# Patient Record
Sex: MALE | Race: WHITE | NOT HISPANIC OR LATINO | Employment: OTHER | URBAN - METROPOLITAN AREA
[De-identification: names, ages, dates, MRNs, and addresses within clinical notes are randomized per-mention and may not be internally consistent; named-entity substitution may affect disease eponyms.]

---

## 2019-01-16 ENCOUNTER — RECORDS - HEALTHEAST (OUTPATIENT)
Dept: LAB | Facility: CLINIC | Age: 84
End: 2019-01-16

## 2019-01-16 LAB
ALBUMIN SERPL-MCNC: 3.7 G/DL (ref 3.5–5)
ALP SERPL-CCNC: 59 U/L (ref 45–120)
ALT SERPL W P-5'-P-CCNC: 17 U/L (ref 0–45)
ANION GAP SERPL CALCULATED.3IONS-SCNC: 8 MMOL/L (ref 5–18)
AST SERPL W P-5'-P-CCNC: 26 U/L (ref 0–40)
BILIRUB SERPL-MCNC: 0.6 MG/DL (ref 0–1)
BUN SERPL-MCNC: 15 MG/DL (ref 8–28)
CALCIUM SERPL-MCNC: 9.1 MG/DL (ref 8.5–10.5)
CHLORIDE BLD-SCNC: 104 MMOL/L (ref 98–107)
CHOLEST SERPL-MCNC: 173 MG/DL
CO2 SERPL-SCNC: 29 MMOL/L (ref 22–31)
CREAT SERPL-MCNC: 0.72 MG/DL (ref 0.7–1.3)
ERYTHROCYTE [DISTWIDTH] IN BLOOD BY AUTOMATED COUNT: 14.4 % (ref 11–14.5)
FASTING STATUS PATIENT QL REPORTED: NORMAL
GFR SERPL CREATININE-BSD FRML MDRD: >60 ML/MIN/1.73M2
GLUCOSE BLD-MCNC: 86 MG/DL (ref 70–125)
HCT VFR BLD AUTO: 47.7 % (ref 40–54)
HDLC SERPL-MCNC: 57 MG/DL
HGB BLD-MCNC: 14.8 G/DL (ref 14–18)
LDLC SERPL CALC-MCNC: 100 MG/DL
MCH RBC QN AUTO: 29.5 PG (ref 27–34)
MCHC RBC AUTO-ENTMCNC: 31 G/DL (ref 32–36)
MCV RBC AUTO: 95 FL (ref 80–100)
PLATELET # BLD AUTO: 198 THOU/UL (ref 140–440)
PMV BLD AUTO: 12 FL (ref 8.5–12.5)
POTASSIUM BLD-SCNC: 4.5 MMOL/L (ref 3.5–5)
PROT SERPL-MCNC: 6 G/DL (ref 6–8)
RBC # BLD AUTO: 5.01 MILL/UL (ref 4.4–6.2)
SODIUM SERPL-SCNC: 141 MMOL/L (ref 136–145)
TRIGL SERPL-MCNC: 79 MG/DL
WBC: 6 THOU/UL (ref 4–11)

## 2020-01-17 ENCOUNTER — RECORDS - HEALTHEAST (OUTPATIENT)
Dept: LAB | Facility: CLINIC | Age: 85
End: 2020-01-17

## 2020-01-17 LAB
ALBUMIN SERPL-MCNC: 3.7 G/DL (ref 3.5–5)
ALP SERPL-CCNC: 73 U/L (ref 45–120)
ALT SERPL W P-5'-P-CCNC: 14 U/L (ref 0–45)
ANION GAP SERPL CALCULATED.3IONS-SCNC: 9 MMOL/L (ref 5–18)
AST SERPL W P-5'-P-CCNC: 23 U/L (ref 0–40)
BILIRUB SERPL-MCNC: 0.4 MG/DL (ref 0–1)
BUN SERPL-MCNC: 14 MG/DL (ref 8–28)
CALCIUM SERPL-MCNC: 8.9 MG/DL (ref 8.5–10.5)
CHLORIDE BLD-SCNC: 101 MMOL/L (ref 98–107)
CHOLEST SERPL-MCNC: 160 MG/DL
CO2 SERPL-SCNC: 30 MMOL/L (ref 22–31)
CREAT SERPL-MCNC: 0.73 MG/DL (ref 0.7–1.3)
FASTING STATUS PATIENT QL REPORTED: NORMAL
GFR SERPL CREATININE-BSD FRML MDRD: >60 ML/MIN/1.73M2
GLUCOSE BLD-MCNC: 83 MG/DL (ref 70–125)
HDLC SERPL-MCNC: 46 MG/DL
LDLC SERPL CALC-MCNC: 102 MG/DL
POTASSIUM BLD-SCNC: 4.4 MMOL/L (ref 3.5–5)
PROT SERPL-MCNC: 6.1 G/DL (ref 6–8)
SODIUM SERPL-SCNC: 140 MMOL/L (ref 136–145)
TRIGL SERPL-MCNC: 58 MG/DL

## 2021-02-10 ENCOUNTER — RECORDS - HEALTHEAST (OUTPATIENT)
Dept: LAB | Facility: CLINIC | Age: 86
End: 2021-02-10

## 2021-02-10 LAB
ALBUMIN SERPL-MCNC: 4 G/DL (ref 3.5–5)
ALP SERPL-CCNC: 74 U/L (ref 45–120)
ALT SERPL W P-5'-P-CCNC: 18 U/L (ref 0–45)
ANION GAP SERPL CALCULATED.3IONS-SCNC: 6 MMOL/L (ref 5–18)
AST SERPL W P-5'-P-CCNC: 24 U/L (ref 0–40)
BILIRUB SERPL-MCNC: 0.6 MG/DL (ref 0–1)
BUN SERPL-MCNC: 19 MG/DL (ref 8–28)
CALCIUM SERPL-MCNC: 9.3 MG/DL (ref 8.5–10.5)
CHLORIDE BLD-SCNC: 104 MMOL/L (ref 98–107)
CHOLEST SERPL-MCNC: 206 MG/DL
CO2 SERPL-SCNC: 32 MMOL/L (ref 22–31)
CREAT SERPL-MCNC: 0.8 MG/DL (ref 0.7–1.3)
FASTING STATUS PATIENT QL REPORTED: ABNORMAL
GFR SERPL CREATININE-BSD FRML MDRD: >60 ML/MIN/1.73M2
GLUCOSE BLD-MCNC: 83 MG/DL (ref 70–125)
HDLC SERPL-MCNC: 58 MG/DL
LDLC SERPL CALC-MCNC: 128 MG/DL
POTASSIUM BLD-SCNC: 4.1 MMOL/L (ref 3.5–5)
PROT SERPL-MCNC: 6.6 G/DL (ref 6–8)
SODIUM SERPL-SCNC: 142 MMOL/L (ref 136–145)
TRIGL SERPL-MCNC: 99 MG/DL

## 2021-05-28 ENCOUNTER — RECORDS - HEALTHEAST (OUTPATIENT)
Dept: ADMINISTRATIVE | Facility: CLINIC | Age: 86
End: 2021-05-28

## 2022-06-29 ENCOUNTER — LAB REQUISITION (OUTPATIENT)
Dept: LAB | Facility: CLINIC | Age: 87
End: 2022-06-29
Payer: COMMERCIAL

## 2022-06-29 DIAGNOSIS — I10 ESSENTIAL (PRIMARY) HYPERTENSION: ICD-10-CM

## 2022-06-29 PROCEDURE — 80053 COMPREHEN METABOLIC PANEL: CPT | Mod: ORL | Performed by: FAMILY MEDICINE

## 2022-06-29 PROCEDURE — 84443 ASSAY THYROID STIM HORMONE: CPT | Mod: ORL | Performed by: FAMILY MEDICINE

## 2022-06-30 LAB
ALBUMIN SERPL BCG-MCNC: 4 G/DL (ref 3.5–5.2)
ALP SERPL-CCNC: 69 U/L (ref 40–129)
ALT SERPL W P-5'-P-CCNC: 15 U/L (ref 10–50)
ANION GAP SERPL CALCULATED.3IONS-SCNC: 10 MMOL/L (ref 7–15)
AST SERPL W P-5'-P-CCNC: 23 U/L (ref 10–50)
BILIRUB SERPL-MCNC: 0.3 MG/DL
BUN SERPL-MCNC: 15.9 MG/DL (ref 8–23)
CALCIUM SERPL-MCNC: 8.7 MG/DL (ref 8.8–10.2)
CHLORIDE SERPL-SCNC: 102 MMOL/L (ref 98–107)
CREAT SERPL-MCNC: 1.04 MG/DL (ref 0.67–1.17)
DEPRECATED HCO3 PLAS-SCNC: 26 MMOL/L (ref 22–29)
GFR SERPL CREATININE-BSD FRML MDRD: 70 ML/MIN/1.73M2
GLUCOSE SERPL-MCNC: 104 MG/DL (ref 70–99)
POTASSIUM SERPL-SCNC: 4.5 MMOL/L (ref 3.4–5.3)
PROT SERPL-MCNC: 5.9 G/DL (ref 6.4–8.3)
SODIUM SERPL-SCNC: 138 MMOL/L (ref 136–145)
TSH SERPL DL<=0.005 MIU/L-ACNC: 4.04 UIU/ML (ref 0.3–4.2)

## 2023-07-10 ENCOUNTER — LAB REQUISITION (OUTPATIENT)
Dept: LAB | Facility: CLINIC | Age: 88
End: 2023-07-10
Payer: COMMERCIAL

## 2023-07-10 DIAGNOSIS — I10 ESSENTIAL (PRIMARY) HYPERTENSION: ICD-10-CM

## 2023-07-10 LAB
ANION GAP SERPL CALCULATED.3IONS-SCNC: 13 MMOL/L (ref 7–15)
BUN SERPL-MCNC: 16.2 MG/DL (ref 8–23)
CALCIUM SERPL-MCNC: 9.1 MG/DL (ref 8.8–10.2)
CHLORIDE SERPL-SCNC: 106 MMOL/L (ref 98–107)
CREAT SERPL-MCNC: 0.69 MG/DL (ref 0.67–1.17)
DEPRECATED HCO3 PLAS-SCNC: 24 MMOL/L (ref 22–29)
ERYTHROCYTE [DISTWIDTH] IN BLOOD BY AUTOMATED COUNT: 16.4 % (ref 10–15)
GFR SERPL CREATININE-BSD FRML MDRD: 90 ML/MIN/1.73M2
GLUCOSE SERPL-MCNC: 87 MG/DL (ref 70–99)
HCT VFR BLD AUTO: 49.4 % (ref 40–53)
HGB BLD-MCNC: 15.7 G/DL (ref 13.3–17.7)
MCH RBC QN AUTO: 33 PG (ref 26.5–33)
MCHC RBC AUTO-ENTMCNC: 31.8 G/DL (ref 31.5–36.5)
MCV RBC AUTO: 104 FL (ref 78–100)
PLATELET # BLD AUTO: 260 10E3/UL (ref 150–450)
POTASSIUM SERPL-SCNC: 4.5 MMOL/L (ref 3.4–5.3)
RBC # BLD AUTO: 4.76 10E6/UL (ref 4.4–5.9)
SODIUM SERPL-SCNC: 143 MMOL/L (ref 136–145)
WBC # BLD AUTO: 7.4 10E3/UL (ref 4–11)

## 2023-07-10 PROCEDURE — 85027 COMPLETE CBC AUTOMATED: CPT | Mod: ORL | Performed by: FAMILY MEDICINE

## 2023-07-10 PROCEDURE — 80048 BASIC METABOLIC PNL TOTAL CA: CPT | Mod: ORL | Performed by: FAMILY MEDICINE

## 2023-12-27 ENCOUNTER — HOSPITAL ENCOUNTER (INPATIENT)
Facility: HOSPITAL | Age: 88
LOS: 7 days | Discharge: HOME HEALTH | DRG: 179 | End: 2024-01-03
Attending: EMERGENCY MEDICINE | Admitting: INTERNAL MEDICINE
Payer: COMMERCIAL

## 2023-12-27 ENCOUNTER — APPOINTMENT (OUTPATIENT)
Dept: RADIOLOGY | Facility: HOSPITAL | Age: 88
DRG: 179 | End: 2023-12-27
Attending: EMERGENCY MEDICINE
Payer: COMMERCIAL

## 2023-12-27 DIAGNOSIS — U07.1 COVID-19: ICD-10-CM

## 2023-12-27 DIAGNOSIS — I10 BENIGN ESSENTIAL HYPERTENSION: Primary | ICD-10-CM

## 2023-12-27 DIAGNOSIS — R53.1 GENERALIZED WEAKNESS: ICD-10-CM

## 2023-12-27 PROBLEM — R79.89 ELEVATED TROPONIN: Status: ACTIVE | Noted: 2023-12-27

## 2023-12-27 LAB
ABO/RH(D): NORMAL
ANION GAP SERPL CALCULATED.3IONS-SCNC: 14 MMOL/L (ref 7–15)
ANTIBODY SCREEN: NEGATIVE
BASOPHILS # BLD AUTO: 0 10E3/UL (ref 0–0.2)
BASOPHILS NFR BLD AUTO: 1 %
BUN SERPL-MCNC: 17.6 MG/DL (ref 8–23)
CALCIUM SERPL-MCNC: 9.2 MG/DL (ref 8.8–10.2)
CHLORIDE SERPL-SCNC: 99 MMOL/L (ref 98–107)
CREAT SERPL-MCNC: 0.76 MG/DL (ref 0.67–1.17)
CRP SERPL-MCNC: 71.9 MG/L
D DIMER PPP FEU-MCNC: 2.85 UG/ML FEU (ref 0–0.5)
DEPRECATED HCO3 PLAS-SCNC: 26 MMOL/L (ref 22–29)
EGFRCR SERPLBLD CKD-EPI 2021: 87 ML/MIN/1.73M2
EOSINOPHIL # BLD AUTO: 0.1 10E3/UL (ref 0–0.7)
EOSINOPHIL NFR BLD AUTO: 1 %
ERYTHROCYTE [DISTWIDTH] IN BLOOD BY AUTOMATED COUNT: 16.6 % (ref 10–15)
FLUAV RNA SPEC QL NAA+PROBE: NEGATIVE
FLUBV RNA RESP QL NAA+PROBE: NEGATIVE
GLUCOSE SERPL-MCNC: 111 MG/DL (ref 70–99)
HCT VFR BLD AUTO: 48 % (ref 40–53)
HGB BLD-MCNC: 15.9 G/DL (ref 13.3–17.7)
HOLD SPECIMEN: NORMAL
HOLD SPECIMEN: NORMAL
IMM GRANULOCYTES # BLD: 0.1 10E3/UL
IMM GRANULOCYTES NFR BLD: 1 %
LACTATE SERPL-SCNC: 1.2 MMOL/L (ref 0.7–2)
LYMPHOCYTES # BLD AUTO: 1.2 10E3/UL (ref 0.8–5.3)
LYMPHOCYTES NFR BLD AUTO: 19 %
MAGNESIUM SERPL-MCNC: 2.3 MG/DL (ref 1.7–2.3)
MCH RBC QN AUTO: 35.4 PG (ref 26.5–33)
MCHC RBC AUTO-ENTMCNC: 33.1 G/DL (ref 31.5–36.5)
MCV RBC AUTO: 107 FL (ref 78–100)
MONOCYTES # BLD AUTO: 0.8 10E3/UL (ref 0–1.3)
MONOCYTES NFR BLD AUTO: 12 %
NEUTROPHILS # BLD AUTO: 4.3 10E3/UL (ref 1.6–8.3)
NEUTROPHILS NFR BLD AUTO: 66 %
NRBC # BLD AUTO: 0 10E3/UL
NRBC BLD AUTO-RTO: 0 /100
NT-PROBNP SERPL-MCNC: 115 PG/ML (ref 0–1800)
PLATELET # BLD AUTO: 200 10E3/UL (ref 150–450)
POTASSIUM SERPL-SCNC: 4 MMOL/L (ref 3.4–5.3)
PROCALCITONIN SERPL IA-MCNC: 0.27 NG/ML
RBC # BLD AUTO: 4.49 10E6/UL (ref 4.4–5.9)
RSV RNA SPEC NAA+PROBE: NEGATIVE
SARS-COV-2 RNA RESP QL NAA+PROBE: POSITIVE
SODIUM SERPL-SCNC: 139 MMOL/L (ref 135–145)
SPECIMEN EXPIRATION DATE: NORMAL
TROPONIN T SERPL HS-MCNC: 24 NG/L
WBC # BLD AUTO: 6.5 10E3/UL (ref 4–11)

## 2023-12-27 PROCEDURE — 250N000011 HC RX IP 250 OP 636: Performed by: INTERNAL MEDICINE

## 2023-12-27 PROCEDURE — 85379 FIBRIN DEGRADATION QUANT: CPT | Performed by: EMERGENCY MEDICINE

## 2023-12-27 PROCEDURE — 36415 COLL VENOUS BLD VENIPUNCTURE: CPT | Performed by: EMERGENCY MEDICINE

## 2023-12-27 PROCEDURE — 99285 EMERGENCY DEPT VISIT HI MDM: CPT | Mod: 25

## 2023-12-27 PROCEDURE — 71046 X-RAY EXAM CHEST 2 VIEWS: CPT

## 2023-12-27 PROCEDURE — 83605 ASSAY OF LACTIC ACID: CPT | Performed by: EMERGENCY MEDICINE

## 2023-12-27 PROCEDURE — 82728 ASSAY OF FERRITIN: CPT | Performed by: INTERNAL MEDICINE

## 2023-12-27 PROCEDURE — 87637 SARSCOV2&INF A&B&RSV AMP PRB: CPT | Performed by: EMERGENCY MEDICINE

## 2023-12-27 PROCEDURE — 83735 ASSAY OF MAGNESIUM: CPT | Performed by: INTERNAL MEDICINE

## 2023-12-27 PROCEDURE — 99223 1ST HOSP IP/OBS HIGH 75: CPT | Performed by: INTERNAL MEDICINE

## 2023-12-27 PROCEDURE — 84145 PROCALCITONIN (PCT): CPT | Performed by: EMERGENCY MEDICINE

## 2023-12-27 PROCEDURE — 85004 AUTOMATED DIFF WBC COUNT: CPT | Performed by: EMERGENCY MEDICINE

## 2023-12-27 PROCEDURE — 120N000001 HC R&B MED SURG/OB

## 2023-12-27 PROCEDURE — 84484 ASSAY OF TROPONIN QUANT: CPT | Performed by: INTERNAL MEDICINE

## 2023-12-27 PROCEDURE — 86140 C-REACTIVE PROTEIN: CPT | Performed by: INTERNAL MEDICINE

## 2023-12-27 PROCEDURE — 80048 BASIC METABOLIC PNL TOTAL CA: CPT | Performed by: EMERGENCY MEDICINE

## 2023-12-27 PROCEDURE — 83880 ASSAY OF NATRIURETIC PEPTIDE: CPT | Performed by: EMERGENCY MEDICINE

## 2023-12-27 PROCEDURE — 84484 ASSAY OF TROPONIN QUANT: CPT | Performed by: EMERGENCY MEDICINE

## 2023-12-27 PROCEDURE — 86900 BLOOD TYPING SEROLOGIC ABO: CPT | Performed by: EMERGENCY MEDICINE

## 2023-12-27 PROCEDURE — 36415 COLL VENOUS BLD VENIPUNCTURE: CPT | Performed by: INTERNAL MEDICINE

## 2023-12-27 PROCEDURE — 93005 ELECTROCARDIOGRAM TRACING: CPT | Performed by: INTERNAL MEDICINE

## 2023-12-27 RX ORDER — AMOXICILLIN 250 MG
1 CAPSULE ORAL 2 TIMES DAILY PRN
Status: DISCONTINUED | OUTPATIENT
Start: 2023-12-27 | End: 2024-01-03 | Stop reason: HOSPADM

## 2023-12-27 RX ORDER — AMLODIPINE BESYLATE 10 MG/1
1 TABLET ORAL DAILY
Status: ON HOLD | COMMUNITY
Start: 2023-10-29 | End: 2024-01-03

## 2023-12-27 RX ORDER — LIDOCAINE 40 MG/G
CREAM TOPICAL
Status: DISCONTINUED | OUTPATIENT
Start: 2023-12-27 | End: 2024-01-03 | Stop reason: HOSPADM

## 2023-12-27 RX ORDER — AMOXICILLIN 250 MG
2 CAPSULE ORAL 2 TIMES DAILY PRN
Status: DISCONTINUED | OUTPATIENT
Start: 2023-12-27 | End: 2024-01-03 | Stop reason: HOSPADM

## 2023-12-27 RX ORDER — ONDANSETRON 2 MG/ML
4 INJECTION INTRAMUSCULAR; INTRAVENOUS EVERY 6 HOURS PRN
Status: DISCONTINUED | OUTPATIENT
Start: 2023-12-27 | End: 2024-01-03 | Stop reason: HOSPADM

## 2023-12-27 RX ORDER — CALCIUM CARBONATE 500 MG/1
1000 TABLET, CHEWABLE ORAL 4 TIMES DAILY PRN
Status: DISCONTINUED | OUTPATIENT
Start: 2023-12-27 | End: 2024-01-03 | Stop reason: HOSPADM

## 2023-12-27 RX ORDER — DOCUSATE SODIUM 100 MG/1
100 CAPSULE, LIQUID FILLED ORAL 2 TIMES DAILY
Status: DISCONTINUED | OUTPATIENT
Start: 2023-12-27 | End: 2024-01-03 | Stop reason: HOSPADM

## 2023-12-27 RX ORDER — ONDANSETRON 4 MG/1
4 TABLET, ORALLY DISINTEGRATING ORAL EVERY 6 HOURS PRN
Status: DISCONTINUED | OUTPATIENT
Start: 2023-12-27 | End: 2024-01-03 | Stop reason: HOSPADM

## 2023-12-27 ASSESSMENT — ACTIVITIES OF DAILY LIVING (ADL)
ADLS_ACUITY_SCORE: 33
ADLS_ACUITY_SCORE: 35

## 2023-12-27 NOTE — ED TRIAGE NOTES
Patient is here with daughter who reports that for the past couple days patient has been increased in weakness, had multiple falls ( no loc and no thinners, no striking head). Increased fluids in legs and fatigue.      Triage Assessment (Adult)       Row Name 12/27/23 1821          Triage Assessment    Airway WDL WDL        Respiratory WDL    Respiratory WDL WDL        Peripheral/Neurovascular WDL    Peripheral Neurovascular WDL WDL

## 2023-12-28 ENCOUNTER — APPOINTMENT (OUTPATIENT)
Dept: PHYSICAL THERAPY | Facility: HOSPITAL | Age: 88
DRG: 179 | End: 2023-12-28
Attending: INTERNAL MEDICINE
Payer: COMMERCIAL

## 2023-12-28 ENCOUNTER — APPOINTMENT (OUTPATIENT)
Dept: CARDIOLOGY | Facility: HOSPITAL | Age: 88
DRG: 179 | End: 2023-12-28
Attending: INTERNAL MEDICINE
Payer: COMMERCIAL

## 2023-12-28 ENCOUNTER — APPOINTMENT (OUTPATIENT)
Dept: CT IMAGING | Facility: HOSPITAL | Age: 88
DRG: 179 | End: 2023-12-28
Attending: INTERNAL MEDICINE
Payer: COMMERCIAL

## 2023-12-28 ENCOUNTER — APPOINTMENT (OUTPATIENT)
Dept: OCCUPATIONAL THERAPY | Facility: HOSPITAL | Age: 88
DRG: 179 | End: 2023-12-28
Attending: INTERNAL MEDICINE
Payer: COMMERCIAL

## 2023-12-28 LAB
ALBUMIN SERPL BCG-MCNC: 3.4 G/DL (ref 3.5–5.2)
ALP SERPL-CCNC: 61 U/L (ref 40–150)
ALT SERPL W P-5'-P-CCNC: 20 U/L (ref 0–70)
ANION GAP SERPL CALCULATED.3IONS-SCNC: 10 MMOL/L (ref 7–15)
AST SERPL W P-5'-P-CCNC: 58 U/L (ref 0–45)
ATRIAL RATE - MUSE: 70 BPM
BASOPHILS # BLD AUTO: 0 10E3/UL (ref 0–0.2)
BASOPHILS NFR BLD AUTO: 1 %
BILIRUB SERPL-MCNC: 0.7 MG/DL
BUN SERPL-MCNC: 14.7 MG/DL (ref 8–23)
CALCIUM SERPL-MCNC: 8.5 MG/DL (ref 8.8–10.2)
CHLORIDE SERPL-SCNC: 102 MMOL/L (ref 98–107)
CREAT SERPL-MCNC: 0.68 MG/DL (ref 0.67–1.17)
DEPRECATED HCO3 PLAS-SCNC: 27 MMOL/L (ref 22–29)
DIASTOLIC BLOOD PRESSURE - MUSE: 70 MMHG
EGFRCR SERPLBLD CKD-EPI 2021: 90 ML/MIN/1.73M2
EOSINOPHIL # BLD AUTO: 0.1 10E3/UL (ref 0–0.7)
EOSINOPHIL NFR BLD AUTO: 2 %
ERYTHROCYTE [DISTWIDTH] IN BLOOD BY AUTOMATED COUNT: 16.3 % (ref 10–15)
FERRITIN SERPL-MCNC: 171 NG/ML (ref 31–409)
GLUCOSE SERPL-MCNC: 95 MG/DL (ref 70–99)
HCT VFR BLD AUTO: 40.5 % (ref 40–53)
HGB BLD-MCNC: 13.1 G/DL (ref 13.3–17.7)
IMM GRANULOCYTES # BLD: 0 10E3/UL
IMM GRANULOCYTES NFR BLD: 1 %
INTERPRETATION ECG - MUSE: NORMAL
LYMPHOCYTES # BLD AUTO: 1 10E3/UL (ref 0.8–5.3)
LYMPHOCYTES NFR BLD AUTO: 22 %
MCH RBC QN AUTO: 34.5 PG (ref 26.5–33)
MCHC RBC AUTO-ENTMCNC: 32.3 G/DL (ref 31.5–36.5)
MCV RBC AUTO: 107 FL (ref 78–100)
MONOCYTES # BLD AUTO: 0.8 10E3/UL (ref 0–1.3)
MONOCYTES NFR BLD AUTO: 18 %
NEUTROPHILS # BLD AUTO: 2.5 10E3/UL (ref 1.6–8.3)
NEUTROPHILS NFR BLD AUTO: 56 %
NRBC # BLD AUTO: 0 10E3/UL
NRBC BLD AUTO-RTO: 0 /100
P AXIS - MUSE: 78 DEGREES
PLATELET # BLD AUTO: 161 10E3/UL (ref 150–450)
POTASSIUM SERPL-SCNC: 3.7 MMOL/L (ref 3.4–5.3)
PR INTERVAL - MUSE: 144 MS
PROT SERPL-MCNC: 5.5 G/DL (ref 6.4–8.3)
QRS DURATION - MUSE: 74 MS
QT - MUSE: 390 MS
QTC - MUSE: 421 MS
R AXIS - MUSE: 69 DEGREES
RBC # BLD AUTO: 3.8 10E6/UL (ref 4.4–5.9)
SODIUM SERPL-SCNC: 139 MMOL/L (ref 135–145)
SYSTOLIC BLOOD PRESSURE - MUSE: 159 MMHG
T AXIS - MUSE: 68 DEGREES
TROPONIN T SERPL HS-MCNC: 21 NG/L
TROPONIN T SERPL HS-MCNC: 23 NG/L
VENTRICULAR RATE- MUSE: 70 BPM
WBC # BLD AUTO: 4.4 10E3/UL (ref 4–11)

## 2023-12-28 PROCEDURE — 93306 TTE W/DOPPLER COMPLETE: CPT | Mod: 26 | Performed by: INTERNAL MEDICINE

## 2023-12-28 PROCEDURE — 97535 SELF CARE MNGMENT TRAINING: CPT | Mod: GO

## 2023-12-28 PROCEDURE — G0378 HOSPITAL OBSERVATION PER HR: HCPCS

## 2023-12-28 PROCEDURE — 85025 COMPLETE CBC W/AUTO DIFF WBC: CPT | Performed by: INTERNAL MEDICINE

## 2023-12-28 PROCEDURE — 99232 SBSQ HOSP IP/OBS MODERATE 35: CPT | Performed by: INTERNAL MEDICINE

## 2023-12-28 PROCEDURE — 250N000011 HC RX IP 250 OP 636: Performed by: INTERNAL MEDICINE

## 2023-12-28 PROCEDURE — 97166 OT EVAL MOD COMPLEX 45 MIN: CPT | Mod: GO

## 2023-12-28 PROCEDURE — 120N000001 HC R&B MED SURG/OB

## 2023-12-28 PROCEDURE — 84484 ASSAY OF TROPONIN QUANT: CPT | Performed by: INTERNAL MEDICINE

## 2023-12-28 PROCEDURE — 97162 PT EVAL MOD COMPLEX 30 MIN: CPT | Mod: GP

## 2023-12-28 PROCEDURE — 96372 THER/PROPH/DIAG INJ SC/IM: CPT | Performed by: INTERNAL MEDICINE

## 2023-12-28 PROCEDURE — 80053 COMPREHEN METABOLIC PANEL: CPT | Performed by: INTERNAL MEDICINE

## 2023-12-28 PROCEDURE — C8929 TTE W OR WO FOL WCON,DOPPLER: HCPCS

## 2023-12-28 PROCEDURE — 250N000013 HC RX MED GY IP 250 OP 250 PS 637: Performed by: INTERNAL MEDICINE

## 2023-12-28 PROCEDURE — 71275 CT ANGIOGRAPHY CHEST: CPT

## 2023-12-28 PROCEDURE — 36415 COLL VENOUS BLD VENIPUNCTURE: CPT | Performed by: INTERNAL MEDICINE

## 2023-12-28 RX ORDER — IOPAMIDOL 755 MG/ML
90 INJECTION, SOLUTION INTRAVASCULAR ONCE
Status: COMPLETED | OUTPATIENT
Start: 2023-12-28 | End: 2023-12-28

## 2023-12-28 RX ORDER — GUAIFENESIN 200 MG/10ML
200 LIQUID ORAL EVERY 4 HOURS PRN
Status: DISCONTINUED | OUTPATIENT
Start: 2023-12-28 | End: 2024-01-03 | Stop reason: HOSPADM

## 2023-12-28 RX ORDER — ZINC OXIDE 20 %
OINTMENT (GRAM) TOPICAL
Status: DISCONTINUED | OUTPATIENT
Start: 2023-12-28 | End: 2024-01-03 | Stop reason: HOSPADM

## 2023-12-28 RX ORDER — ENOXAPARIN SODIUM 100 MG/ML
30 INJECTION SUBCUTANEOUS EVERY 24 HOURS
Status: DISCONTINUED | OUTPATIENT
Start: 2023-12-28 | End: 2023-12-28 | Stop reason: DRUGHIGH

## 2023-12-28 RX ORDER — ENOXAPARIN SODIUM 100 MG/ML
40 INJECTION SUBCUTANEOUS EVERY 12 HOURS
Status: DISCONTINUED | OUTPATIENT
Start: 2023-12-28 | End: 2024-01-03 | Stop reason: HOSPADM

## 2023-12-28 RX ADMIN — ENOXAPARIN SODIUM 40 MG: 40 INJECTION SUBCUTANEOUS at 10:08

## 2023-12-28 RX ADMIN — IOPAMIDOL 90 ML: 755 INJECTION, SOLUTION INTRAVENOUS at 19:03

## 2023-12-28 RX ADMIN — GUAIFENESIN 200 MG: 200 SOLUTION ORAL at 10:08

## 2023-12-28 RX ADMIN — FAMOTIDINE 20 MG: 10 INJECTION, SOLUTION INTRAVENOUS at 10:07

## 2023-12-28 RX ADMIN — ENOXAPARIN SODIUM 40 MG: 40 INJECTION SUBCUTANEOUS at 22:24

## 2023-12-28 RX ADMIN — FAMOTIDINE 20 MG: 10 INJECTION, SOLUTION INTRAVENOUS at 22:24

## 2023-12-28 RX ADMIN — DOCUSATE SODIUM 100 MG: 100 CAPSULE, LIQUID FILLED ORAL at 10:07

## 2023-12-28 RX ADMIN — DOCUSATE SODIUM 100 MG: 100 CAPSULE, LIQUID FILLED ORAL at 22:24

## 2023-12-28 ASSESSMENT — ACTIVITIES OF DAILY LIVING (ADL)
ADLS_ACUITY_SCORE: 42
ADLS_ACUITY_SCORE: 35
ADLS_ACUITY_SCORE: 42
DEPENDENT_IADLS:: INDEPENDENT
ADLS_ACUITY_SCORE: 41
ADLS_ACUITY_SCORE: 42
ADLS_ACUITY_SCORE: 41
ADLS_ACUITY_SCORE: 42
ADLS_ACUITY_SCORE: 42
ADLS_ACUITY_SCORE: 43

## 2023-12-28 NOTE — UTILIZATION REVIEW
"Admission Status; Secondary Review Determination     Under the authority of the Utilization Management Committee, the utilization review process indicated a secondary review on Donal Rodriguez.  The review outcome is based on review of the medical records, discussions with staff, and applying clinical experience noted on the date of the review.     (x) Observation Status Appropriate - This patient does not meet hospital inpatient criteria and is placed in observation status. If this patient's primary payer is Medicare and was admitted as an inpatient, Condition Code 44 should be used and patient status changed to \"observation\".     RATIONALE FOR DETERMINATION   Donal Rodriguez is an 88 yr old male who has HTN.  He presented with weakness and was identified to have an acute COVID infection.  No hypoxia.  Hemodynamically stable.  Mild but level troponin elevation.  Anticipate will need placement.    The severity of illness, intensity of service provided, expected LOS and risk for adverse outcome make the care appropriate for further observation; however, doesn't meet criteria for hospital inpatient admission. Dr Mackey notified of this determination and agrees with downgrade of status.      The information on this document is developed by the utilization review team in order for the business office to ensure compliance.  This only denotes the appropriateness of proper admission status and does not reflect the quality of care rendered.         The definitions of Inpatient Status and Observation Status used in making the determination above are those provided in the CMS Coverage Manual, Chapter 1 and Chapter 6, section 70.4.      Sincerely,  María Faustin MD  Utilization Review  Physician Advisor  [facility]    "

## 2023-12-28 NOTE — ED NOTES
Pt ambulated with assist x2, walker, and gait belt. Pt needing most assistance on standing from sitting position. Pt unsteady on feet, with tremors. Able to ambulate better once up and moving - still with an unsteady gait. Pt lives at home independently alone. Daughter reports no one is able to stay with him tonight if he goes home. Dr Rueda updated.

## 2023-12-28 NOTE — UTILIZATION REVIEW
Concurrent stay review; Secondary Review Determination - [facility]        Under the authority of the Utilization Management Committee, the utilization review process indicated a secondary review on the above patient.  The review outcome is based on review of the medical records, discussions with staff, and applying clinical experience noted on the date of the review.        (x) Observation/outpatient Status Appropriate - Concurrent stay review       RATIONALE FOR DETERMINATION:     Patient delayed discharge is related to disposition, there is no medical necessity for inpatient admission at the time of this review. If there is a change in patient status, please resend for review.    The information on this document is developed by the utilization review team in order for the business office to ensure compliance.  This only denotes the appropriateness of proper admission status and does not reflect the quality of care rendered.       The definitions of Inpatient Status and Observation Status used in making the determination above are those provided in the CMS Coverage Manual, Chapter 1 and Chapter 6, section 70.4.       Sincerely,    María Faustin MD

## 2023-12-28 NOTE — ED NOTES
"[facility] ED Handoff Report    ED Chief Complaint: Fatigue    ED Diagnosis:  (U07.1) COVID-19  Comment:   Plan:     (R53.1) Generalized weakness  Comment:   Plan:        PMH:  History reviewed. No pertinent past medical history.     Code Status:  Full Code     Falls Risk: Yes Band: Applied    Current Living Situation/Residence: lives alone     Elimination Status: Continent: Yes     Activity Level: 2 assist    Patients Preferred Language:  English     Needed: No    Vital Signs:  BP (!) 159/70   Pulse 72   Temp 99.5  F (37.5  C) (Oral)   Resp 19   Ht 1.778 m (5' 10\")   Wt 86.2 kg (190 lb)   SpO2 92%   BMI 27.26 kg/m       Cardiac Rhythm: NSR    Pain Score: 1/10    Is the Patient Confused:  No    Last Food or Drink: 12/27/23 at 2300    Focused Assessment:  Patient presents to ED with daughter with c\o fatigue and increased weakness for the past couple days with multiple falls - no loc and no thinners. Also endorses increased fluids in legs.    No pertinent pmhx.    On road test, pt was +weak and required x2 assist to help stand from sitting. Used a walker and gait belt to help ambulate. Gait very unsteady. Discussed with provider who recommend that admission necessary due to increased weakness and unable to ambulate independently.     Pt started on Paxlovid. A&Ox4.     Tests Performed: Done: Labs    Treatments Provided:  see MAR    Family Dynamics/Concerns: No    Family Updated On Visitor Policy: Yes    Plan of Care Communicated to Family: Yes    Who Was Updated about Plan of Care: daughter    Belongings Checklist Done and Signed by Patient: Yes    Belongings Sent with Patient: Clothes, shoes    Medications sent with patient:     Covid: symptomatic, positive    Additional Information:     RN: Winnie Aguirre RN   12/28/2023 12:43 AM      "

## 2023-12-28 NOTE — PROGRESS NOTES
"[facility]    Medicine Progress Note - Hospitalist Service    Date of Admission:  12/27/2023    Assessment & Plan   Donal Rodriguez is a 88 year old male with a medical history significant for hypertension otherwise not much medical problems who is being admitted with acute COVID infection and generalized weakness.      Acute COVID infection-symptoms started 2 days ago.  Patient is on room air.  He was started on Paxlovid.  Will continue.  Optimize lung cares with breathing treatments.  CTA ordered to rule out PE, pending    Generalized weakness-PT OT and sw for placement.  Expected to improve with improvement in COVID infection.    Hypertension-close follow-up and manage as needed    Elevated troponin- troponin down.  EKG with no ST elevations.  Echocardiogram pending.         Diet:  Full  DVT Prophylaxis: Enoxaparin (Lovenox) SQ  [place] Catheter: Not present  Lines: None     Cardiac Monitoring:   Code Status:  Full          Diet: Combination Diet Regular Diet Adult    DVT Prophylaxis: Enoxaparin (Lovenox) SQ  [place] Catheter: Not present  Lines: None     Cardiac Monitoring: ACTIVE order. Indication: Tachyarrhythmias, acute (48 hours)  Code Status: Full Code      Clinically Significant Risk Factors Present on Admission              # Hypoalbuminemia: Lowest albumin = 3.4 g/dL at 12/28/2023  6:32 AM, will monitor as appropriate     # Hypertension: Noted on problem list      # Overweight: Estimated body mass index is 27.26 kg/m  as calculated from the following:    Height as of this encounter: 1.778 m (5' 10\").    Weight as of this encounter: 86.2 kg (190 lb).              Disposition Plan      Expected Discharge Date: 12/29/2023                Pt/ot/sw for placement  Barrier: chest ct and echo pending    Chino Mackey MD  Hospitalist Service  [facility]  Securely message with [employer] (more info)  Text page via eEvent Paging/Directory "   ______________________________________________________________________    Interval History   Pt feels better, still general weak, mild dry cough, but denies sob, no f/c, no cp    Physical Exam   Vital Signs: Temp: 99.5  F (37.5  C) Temp src: Oral BP: 125/67 Pulse: 62   Resp: 18 SpO2: 93 % O2 Device: None (Room air)    Weight: 190 lbs 0 oz    General.  Awake not in acute distress.  HEENT.  Pupils equal round react to light, anicteric, EOM intact.  Neck supple no JVD.  CVS regular rhythm no murmur gallops.  Lungs.  Clear to auscultation bilateral no wheezing or rales.  Abdomen.  Soft nontender bowel sounds present.  Extremities.  No edema no calf tenderness.  Neurological.  General weakness No focal deficit.  Skin no rash. No pallor.  Psych. Impaired memory.      Medical Decision Making       40 MINUTES SPENT BY ME on the date of service doing chart review, history, exam, documentation & further activities per the note.      Data     I have personally reviewed the following data over the past 24 hrs:    4.4  \   13.1 (L)   / 161     139 102 14.7 /  95   3.7 27 0.68 \     ALT: 20 AST: 58 (H) AP: 61 TBILI: 0.7   ALB: 3.4 (L) TOT PROTEIN: 5.5 (L) LIPASE: N/A     Trop: 21 BNP: 115     Procal: 0.27 CRP: 71.90 (H) Lactic Acid: 1.2       INR:  N/A PTT:  N/A   D-dimer:  2.85 (H) Fibrinogen:  N/A     Ferritin:  171 % Retic:  N/A LDH:  N/A       Imaging results reviewed over the past 24 hrs:   Recent Results (from the past 24 hour(s))   Chest XR,  PA & LAT    Narrative    EXAM: XR CHEST 2 VIEWS  LOCATION: [facility]  DATE: 2023    INDICATION: Cough, fever, weakness  COMPARISON: None.      Impression    IMPRESSION: Heart is normal in size. Lungs are clear.   Echocardiogram Complete    Narrative    [id number]  [id number]  [id number]  [id number]^GAURAV^[place] [address]     Name: MARTA KENNEDY  MRN: [id number]  : 1935  Study Date: 2023  11:10 AM  Age: 88 yrs  Gender: Male  Patient Location: [facility]  Reason For Study: MI  Ordering Physician: NIKO NOLASCO  Performed By: JOSE M     BSA: 2.0 m2  Height: 70 in  Weight: 190 lb  HR: 61  BP: 130/66 mmHg  ______________________________________________________________________________  Procedure  Complete Portable Echo Adult. Definity (NDC #07687-571) given intravenously.  ______________________________________________________________________________  Interpretation Summary     1. Normal left ventricular size and systolic performance with a visually  estimated ejection fraction of 65%.  2. There is trace aortic insufficiency.  3. Normal right ventricular size and systolic performance.  ______________________________________________________________________________  Left ventricle:  Normal left ventricular size and systolic performance with a visually  estimated ejection fraction of 65%. There is normal regional wall motion.  There is borderline increase in left ventricular wall thickness.     Assessment of LV Diastolic Function: The cumulative findings suggest normal  diastolic filling [The septal e' velocity is > 7 cm/s & lateral e' velocity  is  < 10 cm/s. The average E/e' is < 14. The TR velocity cannot be determined due  to insufficient tricuspid insufficiency signal. Left atrial volume index is  less than 34 mL/mÂ ].     Right ventricle:  Normal right ventricular size and systolic performance.     Left atrium:  The left atrium is of normal size.     Right atrium:  The right atrium is of normal size.     IVC:  The IVC is of normal caliber.     Aortic valve:  The aortic valve is not well visualized, but suspected to be comprised of  three cusps. There is no significant aortic stenosis. There is trace aortic  insufficiency.     Mitral valve:  The mitral valve appears morphologically normal. There is trace mitral  insufficiency.     Tricuspid valve:  The tricuspid valve is grossly morphologically normal. There is  trace  tricuspid insufficiency.     Pulmonic valve:  The pulmonic valve is grossly morphologically normal.     Thoracic aorta:  The aortic root and proximal ascending aorta are of normal dimension.     Pericardium:  There is no significant pericardial effusion.  ______________________________________________________________________________  ______________________________________________________________________________  MMode/2D Measurements & Calculations  IVSd: 1.2 cm  LVIDd: 4.7 cm  LVIDs: 3.1 cm  LVPWd: 1.2 cm  FS: 33.9 %  LV mass(C)d: 215.1 grams  LV mass(C)dI: 105.3 grams/m2  Ao root diam: 4.0 cm  asc Aorta Diam: 3.6 cm  LVOT diam: 2.1 cm  LVOT area: 3.5 cm2  Ao root diam index Ht(cm/m): 2.2  Ao root diam index BSA (cm/m2): 2.0  Asc Ao diam index BSA (cm/m2): 1.8  Asc Ao diam index Ht(cm/m): 2.0  LA Volume (BP): 61.8 ml     LA Volume Indexed (AL/bp): 32.2 ml/m2  RWT: 0.52  TAPSE: 2.0 cm     Time Measurements  MM HR: 62.0 BPM     Doppler Measurements & Calculations  MV E max kwan: 62.6 cm/sec  MV A max kwan: 109.0 cm/sec  MV E/A: 0.57  MV dec slope: 282.0 cm/sec2  MV dec time: 0.22 sec  Ao V2 max: 148.0 cm/sec  Ao max P.0 mmHg  Ao V2 mean: 96.6 cm/sec  Ao mean P.0 mmHg  Ao V2 VTI: 29.5 cm  PARVEEN(I,D): 3.0 cm2  PARVEEN(V,D): 3.0 cm2  LV V1 max P.7 mmHg  LV V1 max: 129.0 cm/sec  LV V1 VTI: 25.8 cm     SV(LVOT): 89.4 ml  SI(LVOT): 43.8 ml/m2  Pulm Sys Kwan: 51.9 cm/sec  Pulm Carlos Kwan: 38.8 cm/sec  Pulm A Revs Kwan: 30.6 cm/sec  Pulm S/D: 1.3  AV Kwan Ratio (DI): 0.87  PARVEEN Index (cm2/m2): 1.5  E/E': 7.2  E/E' av.1  Lateral E/e': 6.9  Medial E/e': 7.2  Peak E' Kwan: 8.7 cm/sec  RV S Kwan: 13.9 cm/sec     ______________________________________________________________________________  Report approved by: Parris Beyer 2023 03:10 PM

## 2023-12-28 NOTE — PLAN OF CARE
"Goal Outcome Evaluation:      Plan of Care Reviewed With: patient    Overall Patient Progress: improvingOverall Patient Progress: improving    Outcome Evaluation: Pt alert and oriented, sleepy. COVID precautions. Not OOB, too weak to change position independently. Assist X2. Primofit in place. Bruised markings on right abdomen, hip, buttock, and groin fold. BP elevated, otherwise VSS. Slept between cares.    BP (!) 158/71 (BP Location: Left arm)   Pulse 67   Temp 99.3  F (37.4  C) (Axillary)   Resp 18   Ht 1.778 m (5' 10\")   Wt 86.2 kg (190 lb)   SpO2 95%   BMI 27.26 kg/m        Marlon Sterling RN    "

## 2023-12-28 NOTE — PROGRESS NOTES
"   12/28/23 1310   Appointment Info   Signing Clinician's Name / Credentials (OT) Zahra Kevin, OTR/L   Quick Adds   Quick Adds Certification   Living Environment   People in Home alone  (wife lives in separate apartment down the [address])   Current Living Arrangements independent living facility   Self-Care   Fall history within last six months yes   Number of times patient has fallen within last six months 2   Activity/Exercise/Self-Care Comment Pt independent with ADLs. Pt walks with walking sticks at baseline, states that someone told him he should be using a walker, owns FWW.   General Information   Onset of Illness/Injury or Date of Surgery 12/27/23   Referring Physician Chino Mackey MD   Patient/Family Therapy Goal Statement (OT) none stated   Additional Occupational Profile Info/Pertinent History of Current Problem Per chart review, pt \"is a 88 year old male with a medical history significant for hypertension otherwise not much medical problems who is being admitted with acute COVID infection and generalized weakness.\"   Existing Precautions/Restrictions fall;oxygen therapy device and L/min  (on 1 LPM via NC during eval)   Cognitive Status Examination   Orientation Status orientation to person, place and time  (A&Ox4)   Cognitive Status Comments slow processing speed noted   Pain Assessment   Patient Currently in Pain No   Range of Motion Comprehensive   General Range of Motion no range of motion deficits identified   Strength Comprehensive (MMT)   Comment, General Manual Muscle Testing (MMT) Assessment BUE weakness noted functionally   Bed Mobility   Bed Mobility supine-sit   Supine-Sit [place] (Bed Mobility) minimum assist (75% patient effort);2 person assist   Transfers   Transfers sit-stand transfer;bed-chair transfer;toilet transfer   Transfer Skill: Bed to Chair/Chair to Bed   Bed-Chair [place] (Transfers) minimum assist (75% patient effort);2 person assist   Assistive Device (Bed-Chair " Transfers) rolling walker   Sit-Stand Transfer   Sit-Stand [place] (Transfers) moderate assist (50% patient effort);2 person assist   Assistive Device (Sit-Stand Transfers) walker, front-wheeled   Toilet Transfer   Toilet Transfer Comments Per clinical reasoning, recommend Ax2 transfer to [facility] for toileting at this time.   Balance   Balance Comments Pt unsteady on feet using FWW for close transfers.   Activities of Daily Living   BADL Assessment/Intervention lower body dressing;grooming   Lower Body Dressing Assessment/Training   Position (Lower Body Dressing) supine   [place] Level (Lower Body Dressing) dependent (less than 25% patient effort)   Grooming Assessment/Training   Position (Grooming) supine   [place] Level (Grooming) maximum assist (25% patient effort)   Clinical Impression   Criteria for Skilled Therapeutic Interventions Met (OT) Yes, treatment indicated   OT Diagnosis Impaired ability to perform ADLs, IADLs, and functional mobility.   Influenced by the following impairments COVID-19   OT Problem List-Impairments impacting ADL problems related to;activity tolerance impaired;balance;cognition;mobility;strength   Assessment of Occupational Performance 3-5 Performance Deficits   Identified Performance Deficits lower body dressing, toileting, grooming/hygiene, cognition   Planned Therapy Interventions (OT) ADL retraining;IADL retraining;balance training;bed mobility training;cognition;strengthening;transfer training;home program guidelines;progressive activity/exercise;risk factor education   Clinical Decision Making Complexity (OT) detailed assessment/moderate complexity   Risk & Benefits of therapy have been explained evaluation/treatment results reviewed;care plan/treatment goals reviewed;risks/benefits reviewed;current/potential barriers reviewed;participants voiced agreement with care plan;participants included;patient   OT Total Evaluation Time   OT Eval, Moderate Complexity Minutes  (01189) 13   Therapy Certification   Medical Diagnosis COVID-19   Start of Care Date 12/28/23   Certification date from 12/28/23   Certification date to 01/04/24   OT Goals   Therapy Frequency (OT) Daily   OT Predicted Duration/Target Date for Goal Attainment 01/04/24   OT Goals Hygiene/Grooming;Toilet Transfer/Toileting;Cognition;Lower Body Dressing   OT: Hygiene/Grooming minimal assist;using adaptive equipment;while standing   OT: Lower Body Dressing Minimal assist;using adaptive equipment;within precautions;including set-up/clothing retrieval   OT: Toilet Transfer/Toileting Minimal assist;toilet transfer;cleaning and garment management;using adaptive equipment   OT: Cognitive Patient/caregiver will verbalize understanding of cognitive assessment results/recommendations as needed for safe discharge planning   Self-Care/Home Management   Self-Care/Home Mgmt/ADL, Compensatory, Meal Prep Minutes (13906) 10   Symptoms Noted During/After Treatment (Meal Preparation/Planning Training) fatigue   Treatment Detail/Skilled Intervention Pt required set up A and VC to initiate G/H with washcloth supine, Max A required for thoroughness. Pt able to tolerate sitting EOB for 5 min with CGA/SBA. Instructed pt in tech for figure 4 tech for lower body dressing, pt with decreased coordination with LUE noted with functional task, required Max A to complete dressing task with fatigue noted. Pt left in recliner at end of session with chair alarm on and call light in reach.   OT Discharge Planning   OT Plan close transfers, progress functional mobility, monitor cog   OT Discharge Recommendation (DC Rec) Transitional Care Facility   OT Rationale for DC Rec At this time, pt demo weakness, impaired balance, and low activity tolerance, requiring Ax1-2 for all ADLs and only able to ambulate short distances Ax2. Pt lives in ILF and does not have assistance available.   OT Brief overview of current status Min/Mod Ax2 close transfers, Max/total  A lower body dressing, Max A grooming/hygiene   Total Session Time   Timed Code Treatment Minutes 10   Total Session Time (sum of timed and untimed services) 23      [facility]  OUTPATIENT OCCUPATIONAL THERAPY  EVALUATION  PLAN OF TREATMENT FOR OUTPATIENT REHABILITATION  (COMPLETE FOR INITIAL CLAIMS ONLY)  Patient's Last Name, First Name, M.I.  YOB: 1935  Donal Rodriguez                          Provider's Name  [facility] Medical Record No.  [id number]                             Onset Date:  12/27/23   Start of Care Date:  12/28/23   Type:     ___PT   _X_OT   ___SLP Medical Diagnosis:  COVID-19                    OT Diagnosis:  Impaired ability to perform ADLs, IADLs, and functional mobility. Visits from SOC:  1     See note for plan of treatment, functional goals and certification details    I CERTIFY THE NEED FOR THESE SERVICES FURNISHED UNDER        THIS PLAN OF TREATMENT AND WHILE UNDER MY CARE     (Physician co-signature of this document indicates review and certification of the therapy plan).

## 2023-12-28 NOTE — PROVIDER NOTIFICATION
Texted Dr Mackey:    Are you the MD today? Should pt have PT and OT ordered since he came in with weakness. Emily area is red also.

## 2023-12-28 NOTE — PROGRESS NOTES
12/28/23 1312   Appointment Info   Signing Clinician's Name / Credentials (PT) Yovana Hsu,PT   Quick Adds   Quick Adds Certification   Living Environment   People in Home alone;other (see comments)  (wife lives in seperate apartment down the [address])   Current Living Arrangements independent living facility   Home Accessibility other (see comments)  (elevator)   Self-Care   Fall history within last six months yes   Number of times patient has fallen within last six months 2   Activity/Exercise/Self-Care Comment Pt independent with ADLs. Pt walks with walking sticks at baseline, states that someone told him he should be using a walker, owns FWW.   General Information   Onset of Illness/Injury or Date of Surgery 12/27/23   Referring Physician Dr. Ada Lew   Patient/Family Therapy Goals Statement (PT) none stated   Pertinent History of Current Problem (include personal factors and/or comorbidities that impact the POC) Donal Rodriguez is a 88 year old male with a medical history significant for hypertension otherwise not much medical problems who is being admitted with acute COVID infection and generalized weakness.     Patient reportedly in the past 2 days has had progressive weakness to the point that it is difficult to complete his ADLs at home and even walk.  He fell a few times at home without head injury or loss of consciousness.   Existing Precautions/Restrictions other (see comments)  (special precautions)   General Observations pt in bed, agreeable to eval   Cognition   Affect/Mental Status (Cognition) unable/difficult to assess   Range of Motion (ROM)   ROM Comment limited DF   Strength (Manual Muscle Testing)   Strength Comments BLE limted   Bed Mobility   Impairments Contributing to Impaired Bed Mobility decreased strength   Assistive Device (Bed Mobility) bed rails;draw sheet;other (see comments)  (HOB elevated)   Comment, (Bed Mobility) mod AX2   Transfers   Transfer Safety Concerns Noted  decreased balance during turns;decreased weight-shifting ability   Impairments Contributing to Impaired Transfers impaired balance;decreased strength   Comment, (Transfers) sit<>stand modAX2 w/ FWW   Gait/Stairs (Locomotion)   Haynes Level (Gait) minimum assist (75% patient effort)   Assistive Device (Gait) walker, front-wheeled   Distance in Feet (Gait) 4'   Pattern (Gait) step-to   Deviations/Abnormal Patterns (Gait) right sided deviations;weight shifting decreased   Right Sided Gait Deviations foot drop/toe drag   Balance   Balance Comments unsteady   Clinical Impression   Criteria for Skilled Therapeutic Intervention Yes, treatment indicated   PT Diagnosis (PT) decreased functional mobility   Influenced by the following impairments weakness, decreased balance, cognition   Functional limitations due to impairments bed mob, transfers, gait   Clinical Presentation (PT Evaluation Complexity) evolving   Clinical Presentation Rationale presents as medically diagnosed   Clinical Decision Making (Complexity) moderate complexity   Planned Therapy Interventions (PT) bed mobility training;gait training;transfer training;strengthening   Risk & Benefits of therapy have been explained evaluation/treatment results reviewed   PT Total Evaluation Time   PT Eval, Moderate Complexity Minutes (04184) 15   Therapy Certification   Start of care date 12/28/23   Certification date from 12/28/23   Certification date to 01/04/24   Medical Diagnosis COVID-19   Physical Therapy Goals   PT Frequency Daily   PT Predicted Duration/Target Date for Goal Attainment 01/04/24   PT Goals Bed Mobility;Transfers;Gait   PT: Bed Mobility Supine to/from sit  (CGA)   PT: Transfers Sit to/from stand;Bed to/from chair;Assistive device  (CGA)   PT: Gait Rolling walker;50 feet  (CGA)   PT Discharge Planning   PT Plan bed mob, transfers, gait w/ FWW, LE ex   PT Discharge Recommendation (DC Rec) Transitional Care Facility   PT Rationale for DC Rec pt  min/modAx2 for bed mob, transfer and limited gait w/ FWW   PT Brief overview of current status pt min/modAx2 for bed mob, transfer and limited gait w/ FWW   PT Equipment Needed at Discharge walker, rolling   Total Session Time   Total Session Time (sum of timed and untimed services) 15     [facility]  OUTPATIENT PHYSICAL THERAPY EVALUATION  PLAN OF TREATMENT FOR OUTPATIENT REHABILITATION  (COMPLETE FOR INITIAL CLAIMS ONLY)  Patient's Last Name, First Name, M.I.  YOB: 1935  Donal Rodriguez                        Provider's Name  [facility] Medical Record No.  [id number]                             Onset Date:  12/27/23   Start of Care Date:  12/28/23   Type:     _X_PT   ___OT   ___SLP Medical Diagnosis:  COVID-19              PT Diagnosis:  decreased functional mobility Visits from SOC:  1     See note for plan of treatment, functional goals and certification details    I CERTIFY THE NEED FOR THESE SERVICES FURNISHED UNDER        THIS PLAN OF TREATMENT AND WHILE UNDER MY CARE     (Physician co-signature of this document indicates review and certification of the therapy plan).

## 2023-12-28 NOTE — H&P
"[facility]    History and Physical - Hospitalist Service       Date of Admission:  12/27/2023    Assessment & Plan     Donal Rodriguez is a 88 year old male with a medical history significant for hypertension otherwise not much medical problems who is being admitted with acute COVID infection and generalized weakness.    Patient Active Problem List   Diagnosis    Generalized weakness    COVID-19    Benign essential hypertension    Elevated troponin      Acute COVID infection-symptoms started 2 days ago.  Patient is on room air.  He was started on Paxlovid.  I will continue.  Optimize lung cares with breathing treatments.  CTA ordered to rule out PE    Generalized weakness-PT OT when able.  Expected to improve with improvement in COVID infection.    Hypertension-close follow-up and manage as needed    Elevated troponin-will trend troponin.  EKG with no ST elevations.  Obtain echocardiogram for tomorrow.         Diet:  Full  DVT Prophylaxis: Enoxaparin (Lovenox) SQ  [place]ano Catheter: Not present  Lines: None     Cardiac Monitoring:   Code Status:  Full    Clinically Significant Risk Factors Present on Admission                       # Overweight: Estimated body mass index is 27.26 kg/m  as calculated from the following:    Height as of this encounter: 1.778 m (5' 10\").    Weight as of this encounter: 86.2 kg (190 lb).              Disposition Plan           Ada Lew MD  Hospitalist Service  [facility]  Securely message with [employer] (more info)  Text page via [employer] Paging/Directory     ______________________________________________________________________    Chief Complaint   Weakness, COVID-positive        History of Present Illness   Donal Rodriguez is a 88 year old male with a medical history significant for hypertension otherwise not much medical problems who is being admitted with acute COVID infection and generalized weakness.    Patient reportedly in the past " 2 days has had progressive weakness to the point that it is difficult to complete his ADLs at home and even walk.  He fell a few times at home without head injury or loss of consciousness.  He was brought to the ER for further evaluation.  Review of systems is positive for ongoing low-grade fevers, chills and generalized weakness.       Preliminary workup done showed a BMP which was unremarkable.  Initial troponin slightly elevated at 24.  CBC showed a white count of 6.5, hemoglobin 15.9 platelets 200.  COVID test was positive.  Influenza a was negative influenza a and B was negative RSV was negative.  D-dimer was  Elevated at 2.85.  CT PE ordered      Chest x-ray done did not show any acute infiltrates.    ER intervention included Starting patient on Paxlovid.  Patient is admitted for further inpatient cares.  Patient had no major complaints when seen except that he wanted to be 10 so he could sleep on his side      Patient is a full code at this time.    Past Medical History    Hypertension    Past Surgical History   None    Prior to Admission Medications   None        Review of Systems    The 10 point Review of Systems is negative other than noted in the HPI or here.     Social History   I have reviewed this patient's social history and updated it with pertinent information if needed.         Family History     Mom   That       Allergies   No Known Allergies     Physical Exam   Vital Signs: Temp: 99.5  F (37.5  C) Temp src: Oral BP: (!) 159/70 Pulse: 73   Resp: 16 SpO2: 93 % O2 Device: None (Room air)    Weight: 190 lbs 0 oz      General Aox3, appropriate affect, NAD, on RA  HEENT  MMM, EOMI, PERRL  Chest decreased air entry bilaterally at lung bases, no wheezing  Heart RRR, No M/R/G  Abd- Soft, NT, BS+  - Deferred,   Extremity- Moving all extremities, No digital clubbing,   No edema  Neuro- Aox3, CN II- XII intact, No peripheral vision loss  P5/5, T-N, reflexes 2+, plantar reflex downwards,   gait not checked  Skin  Has no tattoo, No skin rash     Medical Decision Making       80 MINUTES SPENT BY ME on the date of service doing chart review, history, exam, documentation & further activities per the note.      ------------------ MEDICAL DECISION MAKING ------------------------------------------------------------------------------------------------------      Data     I have personally reviewed the following data over the past 24 hrs:    6.5  \   15.9   / 200     139 99 17.6 /  111 (H)   4.0 26 0.76 \     Trop: 24 (H) BNP: 115     Procal: 0.27 CRP: N/A Lactic Acid: 1.2         Imaging results reviewed over the past 24 hrs:   Recent Results (from the past 24 hour(s))   Chest XR,  PA & LAT    Narrative    EXAM: XR CHEST 2 VIEWS  LOCATION: [facility]  DATE: 12/27/2023    INDICATION: Cough, fever, weakness  COMPARISON: None.      Impression    IMPRESSION: Heart is normal in size. Lungs are clear.

## 2023-12-28 NOTE — ED PROVIDER NOTES
EMERGENCY DEPARTMENT ENCOUNTER      NAME: Donal Rodriguez  AGE: 88 year old male  YOB: 1935  MRN: [id number]  EVALUATION DATE & TIME: No admission date for patient encounter.    PCP: Andrzej Valdez    ED PROVIDER: Farhad Rueda M.D.      Chief Complaint   Patient presents with    Fall    Fatigue         FINAL IMPRESSION:  1. COVID-19    2. Generalized weakness          ED COURSE & MEDICAL DECISION MAKIN year old male presents to the Emergency Department for evaluation of generalized weakness, chills, and fall.  Patient is vitally stable although with a low-grade temperature elevation when he arrives to the emergency department.  He is not requiring supplemental oxygen.  He does ultimately test positive for COVID-19 which I think accounts for his symptoms.  The remainder of his initial lab evaluations here appear stable.  Chest x-ray is clear of infiltrate (personally reviewed and interpreted).  I do think all of his weakness symptoms can be attributable to his COVID-19 illness.  No signs of superimposed bacterial infection.  We did try to have him get up and ambulate around the department but  was requiring assistance of at least 1 person at all times.  He is accompanied by his daughter to this visit although she does not think she will be able to provide 24-hour supervision and assistance at home so I think will require hospitalization for PT evaluation and consideration of rehab if not rapidly improving.  Did discuss case with hospitalist.  No contraindications to starting antiviral treatment so Paxlovid was ordered.  Also requested a D-dimer addition which was elevated, follow-up CT pulmonary angiogram ordered from hospital medicine service.  Patient will be admitted to [facility].  Patient in agreement with this plan.  Otherwise stable throughout his ED course.    At the conclusion of the encounter I discussed the results of all of the tests and the disposition. The questions  were answered. The patient or family acknowledged understanding and was agreeable with the care plan.     Medical Decision Making    History:  Supplemental history from: Family Member/Significant Other  External Record(s) reviewed: Documented in chart, if applicable.    Work Up:  Chart documentation includes differential considered and any EKGs or imaging independently interpreted by provider, where specified.  In additional to work up documented, I considered the following work up: Documented in chart, if applicable.    External consultation:  Discussion of management with another provider: Documented in chart, if applicable    Complicating factors:  Care impacted by chronic illness: N/A  Care affected by social determinants of health: Access to Medical Care    Disposition considerations: Admit.    MEDICATIONS GIVEN IN THE EMERGENCY:  Medications   nirmatrelvir and ritonavir (PAXLOVID) 300 mg/100 mg therapy pack 3 tablet (3 tablets Oral $Given 12/27/23 2338)   lidocaine 1 % 0.1-1 mL (has no administration in time range)   lidocaine (LMX4) cream (has no administration in time range)   sodium chloride (PF) 0.9% PF flush 3 mL ( Intracatheter Canceled Entry 12/27/23 2259)   sodium chloride (PF) 0.9% PF flush 3 mL (has no administration in time range)   senna-docusate (SENOKOT-S/PERICOLACE) 8.6-50 MG per tablet 1 tablet (has no administration in time range)     Or   senna-docusate (SENOKOT-S/PERICOLACE) 8.6-50 MG per tablet 2 tablet (has no administration in time range)   calcium carbonate (TUMS) chewable tablet 1,000 mg (has no administration in time range)   docusate sodium (COLACE) capsule 100 mg (100 mg Oral Not Given 12/27/23 2308)   ondansetron (ZOFRAN ODT) ODT tab 4 mg (has no administration in time range)     Or   ondansetron (ZOFRAN) injection 4 mg (has no administration in time range)   famotidine (PEPCID) injection 20 mg (20 mg Intravenous Not Given 12/27/23 2305)   guaiFENesin (ROBITUSSIN) 20 mg/mL solution  200 mg (has no administration in time range)   enoxaparin ANTICOAGULANT (LOVENOX) injection 40 mg (has no administration in time range)       NEW PRESCRIPTIONS STARTED AT TODAY'S ER VISIT  Current Discharge Medication List           =================================================================    HPI    Patient information was obtained from: Patient, Daughter    Use of : N/A     Donal Rodriguez is a 88 year old male with no known pertinent history who presents to this ED by private car for evaluation of a fall, fatigue.    Patient reports generalized weakness and fatigue over the last couple days with reports of a few falls about once per day since onset.  No sustained injuries or LOC secondary to these falls.  Although, he did fall next to the toilet and while he was attempting to put a shirt on earlier today.  Denies any pain to his chest or abdomen. No known sick contacts.    Per daughter, patient has been severely tremulous with a dry cough and lower extremity swelling.  No chronic diuretics.  He also lives in an independent senior apartment.     REVIEW OF SYSTEMS   All systems reviewed and negative except as noted in HPI.    PAST MEDICAL HISTORY:  History reviewed. No pertinent past medical history.    PAST SURGICAL HISTORY:  History reviewed. No pertinent surgical history.    CURRENT MEDICATIONS:    Current Facility-Administered Medications   Medication    calcium carbonate (TUMS) chewable tablet 1,000 mg    docusate sodium (COLACE) capsule 100 mg    enoxaparin ANTICOAGULANT (LOVENOX) injection 40 mg    famotidine (PEPCID) injection 20 mg    guaiFENesin (ROBITUSSIN) 20 mg/mL solution 200 mg    lidocaine (LMX4) cream    lidocaine 1 % 0.1-1 mL    nirmatrelvir and ritonavir (PAXLOVID) 300 mg/100 mg therapy pack 3 tablet    ondansetron (ZOFRAN ODT) ODT tab 4 mg    Or    ondansetron (ZOFRAN) injection 4 mg    senna-docusate (SENOKOT-S/PERICOLACE) 8.6-50 MG per tablet 1 tablet    Or    senna-docusate  "(SENOKOT-S/PERICOLACE) 8.6-50 MG per tablet 2 tablet    sodium chloride (PF) 0.9% PF flush 3 mL    sodium chloride (PF) 0.9% PF flush 3 mL       ALLERGIES:  No Known Allergies    FAMILY HISTORY:  No family history on file.    SOCIAL HISTORY:   Social History     Socioeconomic History    Marital status:        VITALS:  BP (!) 158/71 (BP Location: Left arm)   Pulse 67   Temp 99.3  F (37.4  C) (Axillary)   Resp 18   Ht 1.778 m (5' 10\")   Wt 86.2 kg (190 lb)   SpO2 95%   BMI 27.26 kg/m      PHYSICAL EXAM    Constitutional: Well developed, Well nourished, NAD.  HENT: Normocephalic, Atraumatic. Neck Supple.  Eyes: EOMI, Conjunctiva normal.  Respiratory: Breathing comfortably on room air. Speaks full sentences easily. Lungs clear to ascultation.  Cardiovascular: Normal heart rate, Regular rhythm. No peripheral edema.  Abdomen: Soft, nontender  Musculoskeletal: Good range of motion in all major joints. No major deformities noted.  Integument: Warm, Dry.  Neurologic: Alert & awake, Normal motor function, Normal sensory function, No focal deficits noted.   Psychiatric: Cooperative. Affect appropriate.     LAB:  All pertinent labs reviewed and interpreted.  Labs Ordered and Resulted from Time of ED Arrival to Time of ED Departure   BASIC METABOLIC PANEL - Abnormal       Result Value    Sodium 139      Potassium 4.0      Chloride 99      Carbon Dioxide (CO2) 26      Anion Gap 14      Urea Nitrogen 17.6      Creatinine 0.76      GFR Estimate 87      Calcium 9.2      Glucose 111 (*)    TROPONIN T, HIGH SENSITIVITY - Abnormal    Troponin T, High Sensitivity 24 (*)    INFLUENZA A/B, RSV, & SARS-COV2 PCR - Abnormal    Influenza A PCR Negative      Influenza B PCR Negative      RSV PCR Negative      SARS CoV2 PCR Positive (*)    CBC WITH PLATELETS AND DIFFERENTIAL - Abnormal    WBC Count 6.5      RBC Count 4.49      Hemoglobin 15.9      Hematocrit 48.0       (*)     MCH 35.4 (*)     MCHC 33.1      RDW 16.6 (*)  "    Platelet Count 200      % Neutrophils 66      % Lymphocytes 19      % Monocytes 12      % Eosinophils 1      % Basophils 1      % Immature Granulocytes 1      NRBCs per 100 WBC 0      Absolute Neutrophils 4.3      Absolute Lymphocytes 1.2      Absolute Monocytes 0.8      Absolute Eosinophils 0.1      Absolute Basophils 0.0      Absolute Immature Granulocytes 0.1      Absolute NRBCs 0.0     D DIMER QUANTITATIVE - Abnormal    D-Dimer Quantitative 2.85 (*)    CRP INFLAMMATION - Abnormal    CRP Inflammation 71.90 (*)    TROPONIN T, HIGH SENSITIVITY - Abnormal    Troponin T, High Sensitivity 23 (*)    PROCALCITONIN - Normal    Procalcitonin 0.27     LACTIC ACID WHOLE BLOOD - Normal    Lactic Acid 1.2     N TERMINAL PRO BNP OUTPATIENT - Normal    N Terminal Pro BNP Outpatient 115     MAGNESIUM - Normal    Magnesium 2.3     FERRITIN   TYPE AND SCREEN, ADULT    ABO/RH(D) O POS      Antibody Screen Negative      SPECIMEN EXPIRATION DATE [id number]     ABO/RH TYPE AND SCREEN       RADIOLOGY:  Reviewed all pertinent imaging. Please see official radiology report.  Chest XR,  PA & LAT   Final Result   IMPRESSION: Heart is normal in size. Lungs are clear.      Echocardiogram Complete    (Results Pending)   CT Chest Pulmonary Embolism w Contrast    (Results Pending)       I, David Mcnally, am serving as a scribe to document services personally performed by Dr. Farhad Rueda, based on my observation and the provider's statements to me. I, Farhad Rueda MD attest that David Mcnally is acting in a scribe capacity, has observed my performance of the services and has documented them in accordance with my direction.    Farhad Rueda M.D.  Emergency Medicine  [facility] EMERGENCY DEPARTMENT  [address] [place] [id number] [phone]  Dept: [phone]       Farhad Rueda MD  12/28/23 0517

## 2023-12-28 NOTE — MEDICATION SCRIBE - ADMISSION MEDICATION HISTORY
Medication Scribe Admission Medication History    Admission medication history is complete. The information provided in this note is only as accurate as the sources available at the time of the update.    Information Source(s): Patient and CareEverywhere/SureScripts via in-person    Pertinent Information: pt states isn't taking any OTC currently    Changes made to PTA medication list:  Added: None  Deleted: None  Changed: None    Medication Affordability:  Not including over the counter (OTC) medications, was there a time in the past 3 months when you did not take your medications as prescribed because of cost?: No    Allergies reviewed with patient and updates made in EHR: yes    Medication History Completed By: YOGESH TABARES 12/27/2023 10:31 PM    PTA Med List   Medication Sig Last Dose    amLODIPine (NORVASC) 10 MG tablet Take 1 tablet by mouth daily 12/26/2023 at pm

## 2023-12-29 PROCEDURE — G0378 HOSPITAL OBSERVATION PER HR: HCPCS

## 2023-12-29 PROCEDURE — 250N000013 HC RX MED GY IP 250 OP 250 PS 637: Performed by: INTERNAL MEDICINE

## 2023-12-29 PROCEDURE — 96372 THER/PROPH/DIAG INJ SC/IM: CPT | Performed by: INTERNAL MEDICINE

## 2023-12-29 PROCEDURE — 99232 SBSQ HOSP IP/OBS MODERATE 35: CPT | Performed by: INTERNAL MEDICINE

## 2023-12-29 PROCEDURE — 250N000011 HC RX IP 250 OP 636: Performed by: INTERNAL MEDICINE

## 2023-12-29 PROCEDURE — 120N000001 HC R&B MED SURG/OB

## 2023-12-29 RX ADMIN — DOCUSATE SODIUM 100 MG: 100 CAPSULE, LIQUID FILLED ORAL at 08:25

## 2023-12-29 RX ADMIN — FAMOTIDINE 20 MG: 10 INJECTION, SOLUTION INTRAVENOUS at 11:56

## 2023-12-29 RX ADMIN — ENOXAPARIN SODIUM 40 MG: 40 INJECTION SUBCUTANEOUS at 21:53

## 2023-12-29 RX ADMIN — FAMOTIDINE 20 MG: 10 INJECTION, SOLUTION INTRAVENOUS at 22:43

## 2023-12-29 RX ADMIN — ENOXAPARIN SODIUM 40 MG: 40 INJECTION SUBCUTANEOUS at 08:25

## 2023-12-29 ASSESSMENT — ACTIVITIES OF DAILY LIVING (ADL)
ADLS_ACUITY_SCORE: 42
ADLS_ACUITY_SCORE: 49
ADLS_ACUITY_SCORE: 45
ADLS_ACUITY_SCORE: 53
ADLS_ACUITY_SCORE: 45
ADLS_ACUITY_SCORE: 53
ADLS_ACUITY_SCORE: 45
ADLS_ACUITY_SCORE: 53

## 2023-12-29 NOTE — PROGRESS NOTES
Care Management Follow Up    Length of Stay (days): 1    Expected Discharge Date: 01/02/2024     Concerns to be Addressed:  covid is barrier to TCU acceptance, [facility] auth       Patient plan of care discussed at interdisciplinary rounds: Yes    Anticipated Discharge Disposition:  TCU     Anticipated Discharge Services:  TCU    Anticipated Discharge DME:  per therapy recs    Patient/family educated on Medicare website which has current facility and service quality ratings:  yes  Education Provided on the Discharge Plan:  per care team  Patient/Family in Agreement with the Plan: yes     Referrals Placed by CM/SW:    Private pay costs discussed: private room/amenity fees    Additional Information:        Social History:  Patient and spouse live in separate apartments (independent living) at [facility]. He is independent with ADLs and IADLs, ambulates with walking stick and has walker and drives.  Spouse is primary family contact. Family willing to transport at discharge.     12/29/23:  Spoke with patient and spouse over the phone about recommendation for TCU. Both state agreement. Preference is for [facility], however; [place] is not within [facility] network. They would like [facility] but they are full until middle of next week.  Provided them with [facility] TCU list. They would like to review. They state understanding that some facilities are not accepting covid positive patients.      CM to follow up with patient and spouse for preferences.       Kalyn Benavides RN

## 2023-12-29 NOTE — PROGRESS NOTES
"PRIMARY DIAGNOSIS: \"GENERIC\" NURSING  OUTPATIENT/OBSERVATION GOALS TO BE MET BEFORE DISCHARGE:  ADLs back to baseline: No    Activity and level of assistance: Up with maximum assistance. Consider SW and/or PT evaluation.     Pain status: Pain free.    Return to near baseline physical activity: No     Discharge Planner Nurse   Safe discharge environment identified: No  Barriers to discharge: Yes       Entered by: Quique Truong RN 12/29/2023 3:05 AM     Please review provider order for any additional goals.   Nurse to notify provider when observation goals have been met and patient is ready for discharge.  "

## 2023-12-29 NOTE — PLAN OF CARE
"Goal Outcome Evaluation:    PRIMARY DIAGNOSIS: \"GENERIC\" NURSING  OUTPATIENT/OBSERVATION GOALS TO BE MET BEFORE DISCHARGE:  ADLs back to baseline: No    Activity and level of assistance: Up with maximum assistance. Consider SW and/or PT evaluation.     Pain status: Pain free.    Return to near baseline physical activity: No     Discharge Planner Nurse   Safe discharge environment identified: No  Barriers to discharge: Yes       Entered by: Cristela Shah RN 12/29/2023 10:05 AM    Pt telemetry; NSR. Ok to discontinue tele per Dr. Hickey.     Please review provider order for any additional goals.   Nurse to notify provider when observation goals have been met and patient is ready for discharge.  "

## 2023-12-29 NOTE — PLAN OF CARE
"PRIMARY DIAGNOSIS: \"GENERIC\" NURSING  OUTPATIENT/OBSERVATION GOALS TO BE MET BEFORE DISCHARGE:  ADLs back to baseline: Yes    Activity and level of assistance: Up with maximum assistance. Consider SW and/or PT evaluation.      Pain status: Pain free.    Return to near baseline physical activity: Yes     Discharge Planner Nurse   Safe discharge environment identified: No  Barriers to discharge: Yes       Entered by: Jo Wells RN 12/28/2023 6:52 PM     Please review provider order for any additional goals.   Nurse to notify provider when observation goals have been met and patient is ready for discharge.Goal Outcome Evaluation:                        "

## 2023-12-29 NOTE — PLAN OF CARE
"PRIMARY DIAGNOSIS: \"GENERIC\" NURSING  OUTPATIENT/OBSERVATION GOALS TO BE MET BEFORE DISCHARGE:  ADLs back to baseline: Yes    Activity and level of assistance: Up with maximum assistance. Consider SW and/or PT evaluation.     Pain status: Pain free.    Return to near baseline physical activity: Yes     Discharge Planner Nurse   Safe discharge environment identified: Yes  Barriers to discharge: Yes       Entered by: Jo Wells RN 12/28/2023 10:37 PM     Please review provider order for any additional goals.   Nurse to notify provider when observation goals have been met and patient is ready for discharge.  "

## 2023-12-29 NOTE — PROGRESS NOTES
"PRIMARY DIAGNOSIS: \"GENERIC\" NURSING  OUTPATIENT/OBSERVATION GOALS TO BE MET BEFORE DISCHARGE:  ADLs back to baseline: No    Activity and level of assistance: Up with maximum assistance. Consider SW and/or PT evaluation.     Pain status: Pain free.    Return to near baseline physical activity: No     Discharge Planner Nurse   Safe discharge environment identified: No  Barriers to discharge: Yes       Entered by: Cristela Shah RN 12/29/2023 2:45 PM     Pt had no s/s resp distress; O2 sat 90-91% RA.  Pt resting quietly.      Please review provider order for any additional goals.   Nurse to notify provider when observation goals have been met and patient is ready for discharge.  "

## 2023-12-29 NOTE — PROGRESS NOTES
"PRIMARY DIAGNOSIS: \"GENERIC\" NURSING  OUTPATIENT/OBSERVATION GOALS TO BE MET BEFORE DISCHARGE:  ADLs back to baseline: No    Activity and level of assistance: Up with maximum assistance. Consider SW and/or PT evaluation.     Pain status: Pain free.    Return to near baseline physical activity: No     Discharge Planner Nurse   Safe discharge environment identified: No  Barriers to discharge: Yes       Entered by: Quique Truong RN 12/29/2023 4:05 AM     Please review provider order for any additional goals.   Nurse to notify provider when observation goals have been met and patient is ready for discharge.    Pt A&O x4. Vital signs stable. O2 sats between 90-92 on room air. Pt denied pain/nausea/shortness of breath. Pt repositioned with assist of 2. Primofit in place. Tele displayed NSR. Pt slept throughout shift. Pt on COVID precautions. Pt able to make needs  known.   Quique Truong RN    "

## 2023-12-29 NOTE — PLAN OF CARE
"  Problem: Adult Inpatient Plan of Care  Goal: Plan of Care Review  Description: The Plan of Care Review/Shift note should be completed every shift.  The Outcome Evaluation is a brief statement about your assessment that the patient is improving, declining, or no change.  This information will be displayed automatically on your shift  note.  12/28/2023 2255 by Jo Wells RN  Outcome: Progressing  12/28/2023 2235 by Jo Wells RN  Outcome: Progressing  12/28/2023 2235 by Jo Wells RN  Outcome: Progressing  12/28/2023 1851 by Jo Wells RN  Outcome: Progressing     Problem: Adult Inpatient Plan of Care  Goal: Patient-Specific Goal (Individualized)  Description: You can add care plan individualizations to a care plan. Examples of Individualization might be:  \"Parent requests to be called daily at 9am for status\", \"I have a hard time hearing out of my right ear\", or \"Do not touch me to wake me up as it startles  me\".  12/28/2023 2255 by Jo Wells RN  Outcome: Progressing  12/28/2023 2235 by Jo Wells RN  Outcome: Progressing  12/28/2023 2235 by Jo Wells RN  Outcome: Progressing  12/28/2023 1851 by Jo Wells RN  Outcome: Progressing     Problem: Adult Inpatient Plan of Care  Goal: Absence of Hospital-Acquired Illness or Injury  Intervention: Identify and Manage Fall Risk  Recent Flowsheet Documentation  Taken 12/28/2023 2029 by Jo Wells RN  Safety Promotion/Fall Prevention:   activity supervised   assistive device/personal items within reach   clutter free environment maintained   lighting adjusted   room near nurse's station  Taken 12/28/2023 1712 by Jo Wells RN  Safety Promotion/Fall Prevention:   activity supervised   assistive device/personal items within reach   clutter free environment maintained   lighting adjusted   room near nurse's station     Problem: Adult Inpatient Plan of Care  Goal: Readiness for Transition of Care  12/28/2023 2255 by Jo Wells RN  Outcome: " Progressing  12/28/2023 2235 by Jo Wells RN  Outcome: Progressing  12/28/2023 2235 by Jo Wells RN  Outcome: Progressing  12/28/2023 1851 by Jo Wells RN  Outcome: Progressing     Problem: Risk for Delirium  Goal: Improved Behavioral Control  12/28/2023 2255 by Jo Wells RN  Outcome: Progressing  12/28/2023 2235 by Jo Wells RN  Outcome: Progressing  12/28/2023 2235 by Jo Wells RN  Outcome: Progressing  12/28/2023 1851 by Jo Wells RN  Outcome: Progressing  Intervention: Minimize Safety Risk  Recent Flowsheet Documentation  Taken 12/28/2023 2029 by Jo Wells RN  Enhanced Safety Measures: room near unit station  Taken 12/28/2023 1712 by Jo Wells RN  Enhanced Safety Measures: room near unit station     Problem: Risk for Delirium  Goal: Improved Attention and Thought Clarity  12/28/2023 2255 by Jo Wells RN  Outcome: Progressing     Problem: Gas Exchange Impaired  Goal: Optimal Gas Exchange  12/28/2023 2255 by Jo Wells RN  Outcome: Progressing  12/28/2023 2235 by Jo Wells RN  Outcome: Progressing  12/28/2023 2235 by Jo Wells RN  Outcome: Progressing  12/28/2023 1851 by Jo Wells RN  Outcome: Progressing     Problem: Fall Injury Risk  Goal: Absence of Fall and Fall-Related Injury  12/28/2023 2255 by Jo Wells RN  Outcome: Progressing  12/28/2023 2235 by Jo Wells RN  Outcome: Progressing  12/28/2023 2235 by Jo Wells RN  Outcome: Progressing  12/28/2023 1851 by Jo Wells RN  Outcome: Progressing  Intervention: Identify and Manage Contributors  Recent Flowsheet Documentation  Taken 12/28/2023 2029 by Jo Wells RN  Medication Review/Management: medications reviewed  Taken 12/28/2023 1712 by Jo Wells RN  Medication Review/Management: medications reviewed  Intervention: Promote Injury-Free Environment  Recent Flowsheet Documentation  Taken 12/28/2023 2029 by Jo Wells RN  Safety Promotion/Fall Prevention:   activity supervised   assistive device/personal  items within reach   clutter free environment maintained   lighting adjusted   room near nurse's station  Taken 12/28/2023 1712 by Jo Wells, MACARENA  Safety Promotion/Fall Prevention:   activity supervised   assistive device/personal items within reach   clutter free environment maintained   lighting adjusted   room near nurse's station   Goal Outcome Evaluation:       Pt is alert and oriented times 4. Pt had a CT done today. Pt ambulated with an assist 2, walker, and gait belt. Pt is calm and cooperative.     Jo Wells, RN

## 2023-12-29 NOTE — PROGRESS NOTES
[facility]    Medicine Progress Note - Hospitalist Service    Date of Admission:  12/27/2023    Assessment & Plan   Donal Rodriguez is a 88 year old male with a medical history significant for hypertension otherwise not much medical problems who is being admitted with acute COVID infection and generalized weakness.      Acute COVID infection  Symptoms started 2 days prior to admission.    Patient is on room air.  He was started on Paxlovid ( day #3 today)    Optimize lung cares with breathing treatments.    CTA with mild patchy groundglass opacity in the lungs. Some of this is probably due to dependent atelectasis, however given the patient's history, some of this may represent COVID-19 pneumonitis as well. Multiple scattered indeterminate   pulmonary nodules more so in the lower lungs. The largest nodule is seen in the subpleural location in the left lower lobe posteriorly measuring 1 cm on series 6 image 126. Follow-up is recommended. Tiny calcified granuloma in the right lung. No pleural effusion. No PE noted    Generalized weakness  PT OT and sw for placement.  Expected to improve with improvement in COVID infection.    Hypertension  close follow-up and manage as needed    Elevated troponin-  troponin down.  EKG with no ST elevations.  Echocardiogram  with normal EF and no acute wall motion abnormality noted          Diet:  Full  DVT Prophylaxis: Enoxaparin (Lovenox) SQ  Solano Catheter: Not present  Lines: None     Cardiac Monitoring:   Code Status:  Full          Diet: Combination Diet Regular Diet Adult    DVT Prophylaxis: Enoxaparin (Lovenox) SQ  Solano Catheter: Not present  Lines: None     Cardiac Monitoring: None  Code Status: Full Code      Clinically Significant Risk Factors Present on Admission              # Hypoalbuminemia: Lowest albumin = 3.4 g/dL at 12/28/2023  6:32 AM, will monitor as appropriate     # Hypertension: Noted on problem list      # Overweight: Estimated body  "mass index is 27.26 kg/m  as calculated from the following:    Height as of this encounter: 1.778 m (5' 10\").    Weight as of this encounter: 86.2 kg (190 lb).              Disposition Plan      Expected Discharge Date: 2023        Discharge Comments: tcu        Pt/ot/sw for placement  Barrier: chest ct and echo pending    Ruperto Cam MD  Hospitalist Service  [facility]  Securely message with [employer] (more info)  Text page via [employer] Paging/Directory   ______________________________________________________________________    Interval History   Charts reviewed and patient was examined. No acute events   Still very weak  No fever or chills     Physical Exam   Vital Signs: Temp: 98.5  F (36.9  C) Temp src: Oral BP: 138/73 Pulse: 57   Resp: 18 SpO2: 91 % O2 Device: None (Room air) Oxygen Delivery: 1 LPM  Weight: 190 lbs 0 oz    General.  Awake not in acute distress.  HEENT.  Pupils equal round react to light, anicteric, EOM intact.  Neck supple no JVD.  CVS regular rhythm no murmur gallops.  Lungs.  Clear to auscultation bilateral no wheezing or rales.  Abdomen.  Soft nontender bowel sounds present.  Extremities.  No edema no calf tenderness.  Neurological.  General weakness No focal deficit.  Skin no rash. No pallor.  Psych. Impaired memory.      Medical Decision Making       25 MINUTES SPENT BY ME on the date of service doing chart review, history, exam, documentation & further activities per the note.      Data         Imaging results reviewed over the past 24 hrs:   Recent Results (from the past 24 hour(s))   Echocardiogram Complete    Narrative    [id number]  [id number]  [id number]  [id number]^TOTOE^[id number] [address]     Name: MARTA KENNEDY  MRN: [id number]  : 1935  Study Date: 2023 11:10 AM  Age: 88 yrs  Gender: Male  Patient Location: P2  Reason For Study: MI  Ordering Physician: NIKO NOLASCO " A  Performed By: JOSE M     BSA: 2.0 m2  Height: 70 in  Weight: 190 lb  HR: 61  BP: 130/66 mmHg  ______________________________________________________________________________  Procedure  Complete Portable Echo Adult. Definity (NDC #16848-355) given intravenously.  ______________________________________________________________________________  Interpretation Summary     1. Normal left ventricular size and systolic performance with a visually  estimated ejection fraction of 65%.  2. There is trace aortic insufficiency.  3. Normal right ventricular size and systolic performance.  ______________________________________________________________________________  Left ventricle:  Normal left ventricular size and systolic performance with a visually  estimated ejection fraction of 65%. There is normal regional wall motion.  There is borderline increase in left ventricular wall thickness.     Assessment of LV Diastolic Function: The cumulative findings suggest normal  diastolic filling [The septal e' velocity is > 7 cm/s & lateral e' velocity  is  < 10 cm/s. The average E/e' is < 14. The TR velocity cannot be determined due  to insufficient tricuspid insufficiency signal. Left atrial volume index is  less than 34 mL/mÂ ].     Right ventricle:  Normal right ventricular size and systolic performance.     Left atrium:  The left atrium is of normal size.     Right atrium:  The right atrium is of normal size.     IVC:  The IVC is of normal caliber.     Aortic valve:  The aortic valve is not well visualized, but suspected to be comprised of  three cusps. There is no significant aortic stenosis. There is trace aortic  insufficiency.     Mitral valve:  The mitral valve appears morphologically normal. There is trace mitral  insufficiency.     Tricuspid valve:  The tricuspid valve is grossly morphologically normal. There is trace  tricuspid insufficiency.     Pulmonic valve:  The pulmonic valve is grossly morphologically normal.      Thoracic aorta:  The aortic root and proximal ascending aorta are of normal dimension.     Pericardium:  There is no significant pericardial effusion.  ______________________________________________________________________________  ______________________________________________________________________________  MMode/2D Measurements & Calculations  IVSd: 1.2 cm  LVIDd: 4.7 cm  LVIDs: 3.1 cm  LVPWd: 1.2 cm  FS: 33.9 %  LV mass(C)d: 215.1 grams  LV mass(C)dI: 105.3 grams/m2  Ao root diam: 4.0 cm  asc Aorta Diam: 3.6 cm  LVOT diam: 2.1 cm  LVOT area: 3.5 cm2  Ao root diam index Ht(cm/m): 2.2  Ao root diam index BSA (cm/m2): 2.0  Asc Ao diam index BSA (cm/m2): 1.8  Asc Ao diam index Ht(cm/m): 2.0  LA Volume (BP): 61.8 ml     LA Volume Indexed (AL/bp): 32.2 ml/m2  RWT: 0.52  TAPSE: 2.0 cm     Time Measurements  MM HR: 62.0 BPM     Doppler Measurements & Calculations  MV E max kwan: 62.6 cm/sec  MV A max kwan: 109.0 cm/sec  MV E/A: 0.57  MV dec slope: 282.0 cm/sec2  MV dec time: 0.22 sec  Ao V2 max: 148.0 cm/sec  Ao max P.0 mmHg  Ao V2 mean: 96.6 cm/sec  Ao mean P.0 mmHg  Ao V2 VTI: 29.5 cm  PARVEEN(I,D): 3.0 cm2  PARVEEN(V,D): 3.0 cm2  LV V1 max P.7 mmHg  LV V1 max: 129.0 cm/sec  LV V1 VTI: 25.8 cm     SV(LVOT): 89.4 ml  SI(LVOT): 43.8 ml/m2  Pulm Sys Kwan: 51.9 cm/sec  Pulm Carlos Kwan: 38.8 cm/sec  Pulm A Revs Kwan: 30.6 cm/sec  Pulm S/D: 1.3  AV Kwan Ratio (DI): 0.87  PARVEEN Index (cm2/m2): 1.5  E/E': 7.2  E/E' av.1  Lateral E/e': 6.9  Medial E/e': 7.2  Peak E' Kwan: 8.7 cm/sec  RV S Kwan: 13.9 cm/sec     ______________________________________________________________________________  Report approved by: Parris Beyer 2023 03:10 PM         CT Chest Pulmonary Embolism w Contrast    Narrative    EXAM: CT CHEST PULMONARY EMBOLISM W CONTRAST  LOCATION: [facility]  DATE: 2023    INDICATION: Generalized weakness. COVID infection. Evaluate for PE.  COMPARISON: None.  TECHNIQUE: CT  chest pulmonary angiogram during arterial phase injection of IV contrast. Multiplanar reformats and MIP reconstructions were performed. Dose reduction techniques were used.   CONTRAST: isovue 370 90ml    FINDINGS:  ANGIOGRAM CHEST: Pulmonary arteries are normal caliber and negative for pulmonary emboli. Thoracic aorta is negative for dissection. No CT evidence of right heart strain.    LUNGS AND PLEURA: Mild patchy groundglass opacity in the lungs. Some of this is probably due to dependent atelectasis, however given the patient's history, some of this may represent COVID-19 pneumonitis as well. Multiple scattered indeterminate   pulmonary nodules more so in the lower lungs. The largest nodule is seen in the subpleural location in the left lower lobe posteriorly measuring 1 cm on series 6 image 126. Follow-up is recommended. Tiny calcified granuloma in the right lung. No pleural   effusions.    MEDIASTINUM/AXILLAE: Mildly enlarged right subcarinal lymph node measures 1.2 cm in diameter on series 4 image 146. Normal heart size. No pericardial effusion. Normal caliber thoracic aorta. Esophagus is dilated and partially fluid-filled, nonspecific.    CORONARY ARTERY CALCIFICATION: Severe.    UPPER ABDOMEN: Colonic diverticulosis.    MUSCULOSKELETAL: Normal.      Impression    IMPRESSION:  1.  Negative for pulmonary embolism.    2.  Mild patchy groundglass opacities in the lungs, some of which is due to dependent atelectasis, however some of which could represent COVID-19 pneumonitis.    3.  Multiple indeterminate pulmonary nodules measuring up to 1 cm. These may be infectious or inflammatory in nature but ultimately indeterminate and would be atypical for COVID-19 pneumonitis. Recommend follow-up chest CT in 3 months following   appropriate therapy for reevaluation.    4.  Mildly enlarged mediastinal lymph nodes likely reactive and can be reassessed on follow-up.    5.  Esophagus is dilated and partially fluid-filled  which is a nonspecific finding. Correlation for any signs or dysmotility disorder.    6.  Severe coronary artery calcification.

## 2023-12-30 ENCOUNTER — APPOINTMENT (OUTPATIENT)
Dept: PHYSICAL THERAPY | Facility: HOSPITAL | Age: 88
DRG: 179 | End: 2023-12-30
Payer: COMMERCIAL

## 2023-12-30 LAB
CREAT SERPL-MCNC: 0.6 MG/DL (ref 0.67–1.17)
EGFRCR SERPLBLD CKD-EPI 2021: >90 ML/MIN/1.73M2
PLATELET # BLD AUTO: 202 10E3/UL (ref 150–450)

## 2023-12-30 PROCEDURE — 99232 SBSQ HOSP IP/OBS MODERATE 35: CPT | Performed by: INTERNAL MEDICINE

## 2023-12-30 PROCEDURE — 97530 THERAPEUTIC ACTIVITIES: CPT | Mod: GP,CQ

## 2023-12-30 PROCEDURE — 120N000001 HC R&B MED SURG/OB

## 2023-12-30 PROCEDURE — 82565 ASSAY OF CREATININE: CPT | Performed by: INTERNAL MEDICINE

## 2023-12-30 PROCEDURE — 250N000013 HC RX MED GY IP 250 OP 250 PS 637: Performed by: INTERNAL MEDICINE

## 2023-12-30 PROCEDURE — 36415 COLL VENOUS BLD VENIPUNCTURE: CPT | Performed by: INTERNAL MEDICINE

## 2023-12-30 PROCEDURE — 97116 GAIT TRAINING THERAPY: CPT | Mod: GP

## 2023-12-30 PROCEDURE — 85049 AUTOMATED PLATELET COUNT: CPT | Performed by: INTERNAL MEDICINE

## 2023-12-30 PROCEDURE — G0378 HOSPITAL OBSERVATION PER HR: HCPCS

## 2023-12-30 PROCEDURE — 250N000011 HC RX IP 250 OP 636: Performed by: INTERNAL MEDICINE

## 2023-12-30 PROCEDURE — 96372 THER/PROPH/DIAG INJ SC/IM: CPT | Performed by: INTERNAL MEDICINE

## 2023-12-30 RX ADMIN — ENOXAPARIN SODIUM 40 MG: 40 INJECTION SUBCUTANEOUS at 20:18

## 2023-12-30 RX ADMIN — DOCUSATE SODIUM 100 MG: 100 CAPSULE, LIQUID FILLED ORAL at 20:18

## 2023-12-30 RX ADMIN — FAMOTIDINE 20 MG: 10 INJECTION, SOLUTION INTRAVENOUS at 20:19

## 2023-12-30 RX ADMIN — ENOXAPARIN SODIUM 40 MG: 40 INJECTION SUBCUTANEOUS at 09:20

## 2023-12-30 RX ADMIN — DOCUSATE SODIUM 100 MG: 100 CAPSULE, LIQUID FILLED ORAL at 09:20

## 2023-12-30 ASSESSMENT — ACTIVITIES OF DAILY LIVING (ADL)
ADLS_ACUITY_SCORE: 45
ADLS_ACUITY_SCORE: 53
ADLS_ACUITY_SCORE: 45
ADLS_ACUITY_SCORE: 49
ADLS_ACUITY_SCORE: 45
ADLS_ACUITY_SCORE: 49

## 2023-12-30 NOTE — PLAN OF CARE
Problem: Risk for Delirium  Goal: Optimal Coping  Outcome: Progressing   Goal Outcome Evaluation:  Pt is alert and oriented, able to make needs known. Pt is incontinent of bladder. No loose stool overnight. Pt oxygen was 89% on room air, placed on 1L of oxygen sats back up to 93-94%. Pt denies pain, has been resting when rounded upon. Uneventful shift.  Annalee Raines RN

## 2023-12-30 NOTE — PROGRESS NOTES
"PRIMARY DIAGNOSIS: \"GENERIC\" NURSING  OUTPATIENT/OBSERVATION GOALS TO BE MET BEFORE DISCHARGE:  ADLs back to baseline: Yes    Activity and level of assistance: Up with maximum assistance. Consider SW and/or PT evaluation.     Pain status: Pain free.    Return to near baseline physical activity: No     Discharge Planner Nurse   Safe discharge environment identified: No  Barriers to discharge: Yes       Entered by: Cristela Shah RN 12/30/2023 2:46 PM    Pt had no s/s resp distress. O2 sat 91-93% on RA.      Please review provider order for any additional goals.   Nurse to notify provider when observation goals have been met and patient is ready for discharge.  "

## 2023-12-30 NOTE — PROGRESS NOTES
[facility]    Medicine Progress Note - Hospitalist Service    Date of Admission:  12/27/2023    Assessment & Plan   Donal Rodriguez is a 88 year old male with a medical history significant for hypertension otherwise not much medical problems who is being admitted with acute COVID infection and generalized weakness.      Acute COVID infection  Symptoms started 2 days prior to admission.    Patient is on room air.  He was started on Paxlovid ( day #4 today)    Optimize lung cares with breathing treatments.    CTA with mild patchy groundglass opacity in the lungs. Some of this is probably due to dependent atelectasis, however given the patient's history, some of this may represent COVID-19 pneumonitis as well. Multiple scattered indeterminate   pulmonary nodules more so in the lower lungs. The largest nodule is seen in the subpleural location in the left lower lobe posteriorly measuring 1 cm on series 6 image 126. Follow-up is recommended. Tiny calcified granuloma in the right lung. No pleural effusion. No PE noted   Patient was put on supplemental Oxygen last night ( 1 lts) for desaturation while at sleep. Will plan to wean this. Accept saturations > 90% on RA    Generalized weakness  PT OT and sw for placement.  Expected to improve with improvement in COVID infection.  TCU recommended for now     Hypertension  close follow-up and manage as needed    Elevated troponin  troponin down.  EKG with no ST elevations.  Echocardiogram  with normal EF and no acute wall motion abnormality noted          Diet:  Full  DVT Prophylaxis: Enoxaparin (Lovenox) SQ  Solano Catheter: Not present  Lines: None     Cardiac Monitoring:   Code Status:  Full          Diet: Combination Diet Regular Diet Adult    DVT Prophylaxis: Enoxaparin (Lovenox) SQ  Solano Catheter: Not present  Lines: None     Cardiac Monitoring: None  Code Status: Full Code      Clinically Significant Risk Factors Present on Admission              #  "Hypoalbuminemia: Lowest albumin = 3.4 g/dL at 12/28/2023  6:32 AM, will monitor as appropriate     # Hypertension: Noted on problem list      # Overweight: Estimated body mass index is 27.26 kg/m  as calculated from the following:    Height as of this encounter: 1.778 m (5' 10\").    Weight as of this encounter: 86.2 kg (190 lb).              Disposition Plan      Expected Discharge Date: 01/02/2024    Discharge Delays: Other (Add Comment)  Placement - TCU  Insurance Authorization needed    Discharge Comments: tcu            Ruperto Cam MD  Hospitalist Service  [facility]  Securely message with [employer] (more info)  Text page via [facility] Paging/Directory   ______________________________________________________________________    Interval History   Charts reviewed and patient was examined. No acute events  Patient specifically denies SOB  Advised that we will wean off the Oxygen that was applied last night.   Denies fevers, cough or chills         Physical Exam   Vital Signs: Temp: 97.6  F (36.4  C) Temp src: Oral BP: 125/67 Pulse: 52   Resp: 18 SpO2: 95 % O2 Device: Nasal cannula Oxygen Delivery: 1 LPM  Weight: 190 lbs 0 oz    General.  Awake not in acute distress.  HEENT.  Pupils equal round react to light, anicteric, EOM intact.  Neck supple no JVD.  CVS regular rhythm no murmur gallops.  Lungs.  Clear to auscultation bilateral no wheezing or rales.  Abdomen.  Soft nontender bowel sounds present.  Extremities.  No edema no calf tenderness.  Neurological.  General weakness No focal deficit.  Skin no rash. No pallor.  Psych. Impaired memory.      Medical Decision Making       25 MINUTES SPENT BY ME on the date of service doing chart review, history, exam, documentation & further activities per the note.      Data     I have personally reviewed the following data over the past 24 hrs:    N/A  \   N/A   / 202     N/A N/A N/A /  N/A   N/A N/A 0.60 (L) \       Imaging results " reviewed over the past 24 hrs:   No results found for this or any previous visit (from the past 24 hour(s)).

## 2023-12-30 NOTE — PROGRESS NOTES
Care Management Follow Up    Length of Stay (days): 1    Expected Discharge Date: 01/02/2024     Concerns to be Addressed:       Patient plan of care discussed at interdisciplinary rounds: Yes    Anticipated Discharge Disposition:  TCU     Anticipated Discharge Services:    Anticipated Discharge DME:      Patient/family educated on Medicare website which has current facility and service quality ratings:  Yes  Education Provided on the Discharge Plan:    Patient/Family in Agreement with the Plan:      Referrals Placed by CM/SW:    Private pay costs discussed: Not applicable    Additional Information:  CM called and left VM for patient's wife asking for TCU preferences.     1:58 PM  CM met with patient's wife and grandson outside of patient's room. Wife asked that referrals be sent to . [facility] 2 [facility] 3 [facility] 3 [facility] and 5 [facility]. Referrals sent and pending.     Social History:  Patient and spouse live in separate apartments (independent living) at [facility]. He is independent with ADLs and IADLs, ambulates with walking stick and has walker and drives.  Spouse is primary family contact. Family willing to transport at discharge.    EMMANUEL VacaW

## 2023-12-30 NOTE — PLAN OF CARE
Problem: Adult Inpatient Plan of Care  Goal: Absence of Hospital-Acquired Illness or Injury  Outcome: Not Progressing  Intervention: Identify and Manage Fall Risk  Recent Flowsheet Documentation  Taken 12/29/2023 1600 by Kaylan Fernandez, RN  Safety Promotion/Fall Prevention:   activity supervised   assistive device/personal items within reach   clutter free environment maintained   increased rounding and observation   mobility aid in reach   patient and family education   nonskid shoes/slippers when out of bed   room near nurse's station   treat underlying cause  Intervention: Prevent and Manage VTE (Venous Thromboembolism) Risk  Recent Flowsheet Documentation  Taken 12/29/2023 1600 by Kaylan Fernandez, RN  VTE Prevention/Management: SCDs (sequential compression devices) off, enoxaparin given     Problem: Adult Inpatient Plan of Care  Goal: Optimal Comfort and Wellbeing  Outcome: Not Progressing     Problem: Gas Exchange Impaired  Goal: Optimal Gas Exchange  Outcome: Not Progressing   Goal Outcome Evaluation:    - VSS  - Denies pain  - In bed for shift, incontience cares w/ loose stool x1      Kaylan Fernandez, RN

## 2023-12-30 NOTE — PLAN OF CARE
"PRIMARY DIAGNOSIS: \"GENERIC\" NURSING  OUTPATIENT/OBSERVATION GOALS TO BE MET BEFORE DISCHARGE:  ADLs back to baseline: No    Activity and level of assistance: Up with maximum assistance. Consider SW and/or PT evaluation.     Pain status: Pain free.    Return to near baseline physical activity: No     Discharge Planner Nurse   Safe discharge environment identified: No  Barriers to discharge: Yes       Entered by: Cristela Shah RN 12/30/2023 10:39 AM     Please review provider order for any additional goals.   Nurse to notify provider when observation goals have been met and patient is ready for discharge.Goal Outcome Evaluation:                        "

## 2023-12-31 ENCOUNTER — APPOINTMENT (OUTPATIENT)
Dept: PHYSICAL THERAPY | Facility: HOSPITAL | Age: 88
DRG: 179 | End: 2023-12-31
Payer: COMMERCIAL

## 2023-12-31 PROCEDURE — 96372 THER/PROPH/DIAG INJ SC/IM: CPT | Performed by: INTERNAL MEDICINE

## 2023-12-31 PROCEDURE — 97116 GAIT TRAINING THERAPY: CPT | Mod: GP,CQ

## 2023-12-31 PROCEDURE — 120N000001 HC R&B MED SURG/OB

## 2023-12-31 PROCEDURE — 97110 THERAPEUTIC EXERCISES: CPT | Mod: GP,CQ

## 2023-12-31 PROCEDURE — 250N000013 HC RX MED GY IP 250 OP 250 PS 637: Performed by: INTERNAL MEDICINE

## 2023-12-31 PROCEDURE — 250N000011 HC RX IP 250 OP 636: Performed by: INTERNAL MEDICINE

## 2023-12-31 PROCEDURE — 99232 SBSQ HOSP IP/OBS MODERATE 35: CPT | Performed by: INTERNAL MEDICINE

## 2023-12-31 PROCEDURE — G0378 HOSPITAL OBSERVATION PER HR: HCPCS

## 2023-12-31 RX ADMIN — ENOXAPARIN SODIUM 40 MG: 40 INJECTION SUBCUTANEOUS at 09:29

## 2023-12-31 RX ADMIN — FAMOTIDINE 20 MG: 10 INJECTION, SOLUTION INTRAVENOUS at 09:29

## 2023-12-31 RX ADMIN — ENOXAPARIN SODIUM 40 MG: 40 INJECTION SUBCUTANEOUS at 20:04

## 2023-12-31 RX ADMIN — FAMOTIDINE 20 MG: 10 INJECTION, SOLUTION INTRAVENOUS at 20:04

## 2023-12-31 RX ADMIN — DOCUSATE SODIUM 100 MG: 100 CAPSULE, LIQUID FILLED ORAL at 20:04

## 2023-12-31 RX ADMIN — DOCUSATE SODIUM 100 MG: 100 CAPSULE, LIQUID FILLED ORAL at 09:28

## 2023-12-31 ASSESSMENT — ACTIVITIES OF DAILY LIVING (ADL)
ADLS_ACUITY_SCORE: 43
ADLS_ACUITY_SCORE: 45
ADLS_ACUITY_SCORE: 43
ADLS_ACUITY_SCORE: 45
ADLS_ACUITY_SCORE: 43
ADLS_ACUITY_SCORE: 45

## 2023-12-31 NOTE — PLAN OF CARE
Problem: Adult Inpatient Plan of Care  Goal: Plan of Care Review  Description: The Plan of Care Review/Shift note should be completed every shift.  The Outcome Evaluation is a brief statement about your assessment that the patient is improving, declining, or no change.  This information will be displayed automatically on your shift  note.  Outcome: Progressing   Goal Outcome Evaluation:       Pt alert and oriented x4. Denies shortness of breath or pain. Reports occasional dry cough. Oxygen 91-92%  on RA. Afebrile. Continues to receive Paxlovid. Primofit in place.

## 2023-12-31 NOTE — PROGRESS NOTES
[facility]    Medicine Progress Note - Hospitalist Service    Date of Admission:  12/27/2023    Assessment & Plan   Donal Rodriguez is a 88 year old male with a medical history significant for hypertension otherwise not much medical problems who is being admitted with acute COVID infection and generalized weakness.      Acute COVID infection  Symptoms started 2 days prior to admission.    Patient is on room air.  He was started on Paxlovid ( day #5 today)    Optimize lung cares with breathing treatments.   CTA with mild patchy groundglass opacity in the lungs. Some of this is probably due to dependent atelectasis, however given the patient's history, some of this may represent COVID-19 pneumonitis as well. Multiple scattered indeterminate   pulmonary nodules more so in the lower lungs. The largest nodule is seen in the subpleural location in the left lower lobe posteriorly measuring 1 cm on series 6 image 126. Follow-up is recommended. Tiny calcified granuloma in the right lung. No pleural effusion. No PE noted  Patient was put on supplemental Oxygen om 12/30night ( 1 lts) for desaturation while at sleep. This has now been weaned . Accept saturations > 90% on RA    Generalized weakness  PT OT and sw for placement.  Expected to improve with improvement in COVID infection.  TCU recommended for now     Hypertension  close follow-up and manage as needed    Elevated troponin  troponin down.  EKG with no ST elevations.  Echocardiogram  with normal EF and no acute wall motion abnormality noted          Diet:  Full  DVT Prophylaxis: Enoxaparin (Lovenox) SQ  Solano Catheter: Not present  Lines: None     Cardiac Monitoring:   Code Status:  Full          Diet: Combination Diet Regular Diet Adult    DVT Prophylaxis: Enoxaparin (Lovenox) SQ  Solano Catheter: Not present  Lines: None     Cardiac Monitoring: None  Code Status: Full Code      Clinically Significant Risk Factors Present on Admission             "  # Hypoalbuminemia: Lowest albumin = 3.4 g/dL at 12/28/2023  6:32 AM, will monitor as appropriate     # Hypertension: Noted on problem list      # Overweight: Estimated body mass index is 27.26 kg/m  as calculated from the following:    Height as of this encounter: 1.778 m (5' 10\").    Weight as of this encounter: 86.2 kg (190 lb).              Disposition Plan      Expected Discharge Date: 01/08/2024    Discharge Delays: Placement - TCU  Insurance Authorization needed  Isolation - find facility that will accept    Discharge Comments: tcu            Ruperto Cam MD  Hospitalist Service  [facility]  Securely message with [employer] (more info)  Text page via [employer] Paging/Directory   ______________________________________________________________________    Interval History   Charts reviewed and patient was examined. No acute events  No fever or chills  Eating and drinking well  Feels that he is improving slowly with PT        Physical Exam   Vital Signs: Temp: 98.6  F (37  C) Temp src: Oral BP: (!) 145/79 Pulse: 53   Resp: 18 SpO2: 93 % O2 Device: None (Room air)    Weight: 190 lbs 0 oz    General.  Awake not in acute distress.  HEENT.  Pupils equal round react to light, anicteric, EOM intact.  Neck supple no JVD.  CVS regular rhythm no murmur gallops.  Lungs.  Clear to auscultation bilateral no wheezing or rales.  Abdomen.  Soft nontender bowel sounds present.  Extremities.  No edema no calf tenderness.  Neurological.  General weakness No focal deficit.  Skin no rash. No pallor.  Psych. Impaired memory.      Medical Decision Making       25 MINUTES SPENT BY ME on the date of service doing chart review, history, exam, documentation & further activities per the note.      Data         Imaging results reviewed over the past 24 hrs:   No results found for this or any previous visit (from the past 24 hour(s)).      "

## 2023-12-31 NOTE — PROGRESS NOTES
"PRIMARY DIAGNOSIS: \"GENERIC\" NURSING  OUTPATIENT/OBSERVATION GOALS TO BE MET BEFORE DISCHARGE:  ADLs back to baseline: No    Activity and level of assistance: Up with maximum assistance. Consider SW and/or PT evaluation.     Pain status: Pain free.    Return to near baseline physical activity: No     Discharge Planner Nurse   Safe discharge environment identified: No  Barriers to discharge: Yes       Entered by: Cristela Shah RN 12/31/2023 11:35 AM     Please review provider order for any additional goals.   Nurse to notify provider when observation goals have been met and patient is ready for discharge.  "

## 2023-12-31 NOTE — PROGRESS NOTES
"PRIMARY DIAGNOSIS: \"GENERIC\" NURSING  OUTPATIENT/OBSERVATION GOALS TO BE MET BEFORE DISCHARGE:  ADLs back to baseline: No    Activity and level of assistance: Up with maximum assistance. Consider SW and/or PT evaluation.     Pain status: Pain free.    Return to near baseline physical activity: No     Discharge Planner Nurse   Safe discharge environment identified: No  Barriers to discharge: Yes       Entered by: Cristela Shah RN 12/31/2023 2:21 PM    Pt had nos s/s resp distress. O2 sat 93-95% RA. VSS. Pt is resting quietly.     Please review provider order for any additional goals.   Nurse to notify provider when observation goals have been met and patient is ready for discharge.  "

## 2023-12-31 NOTE — PLAN OF CARE
PRIMARY DIAGNOSIS: Covid  OUTPATIENT/OBSERVATION GOALS TO BE MET BEFORE DISCHARGE:  Dyspnea improved and O2 sats >88% on RA or back to baseline O2 levels: Yes   SpO2: 91 %, O2 Device: None (Room air)    Tolerating oral abx or appropriate plans made outpatient infusion: Yes    Vitals signs normal or return to baseline: Yes    Short term supplemental O2 needed with activity at home:  No home 02 eval has been ordered.    Tolerate oral intake to maintain hydration: Yes    Return to near baseline physical activity: Yes    Discharge Planner Nurse   Safe discharge environment identified: Yes  Barriers to discharge: Yes       Entered by: Javier Guerra RN 12/30/2023 10:24 PM     Please review provider order for any additional goals.   Nurse to notify provider when observation goals have been met and patient is ready for discharge.Goal Outcome Evaluation:

## 2023-12-31 NOTE — PLAN OF CARE
"PRIMARY DIAGNOSIS: \"GENERIC\" NURSING  OUTPATIENT/OBSERVATION GOALS TO BE MET BEFORE DISCHARGE:  ADLs back to baseline: No    Activity and level of assistance: Up with maximum assistance. Consider SW and/or PT evaluation.     Pain status: Pain free.    Return to near baseline physical activity: No     Discharge Planner Nurse   Safe discharge environment identified: No  Barriers to discharge: Yes       Entered by: Annalee Raines RN 12/31/2023 5:30 AM    Pt is alert but forgetful. Incontinent of bowel and bladder. Doesn't remember to use the call light.  Max a-2 to get up. Pt denies pain. Sats 91% on room air. Pt resting inbetween cares.     Please review provider order for any additional goals.   Nurse to notify provider when observation goals have been met and patient is ready for discharge.Goal Outcome Evaluation:                        "

## 2023-12-31 NOTE — PLAN OF CARE
"PRIMARY DIAGNOSIS: \"GENERIC\" NURSING  OUTPATIENT/OBSERVATION GOALS TO BE MET BEFORE DISCHARGE:  ADLs back to baseline: No    Activity and level of assistance: Up with maximum assistance. Consider SW and/or PT evaluation.     Pain status: Pain free.    Return to near baseline physical activity: No     Discharge Planner Nurse   Safe discharge environment identified: No  Barriers to discharge: Yes       Entered by: Annalee Raines RN 12/31/2023 6:31 AM   Pt incontinent of bowel and bladder, forgetful. Pt is on room air sating 91%.  Pt denies pain. Needs TCU placement. Covid +.  Annalee Raines RN    Please review provider order for any additional goals.   Nurse to notify provider when observation goals have been met and patient is ready for discharge.Goal Outcome Evaluation:                        "

## 2024-01-01 PROCEDURE — 120N000001 HC R&B MED SURG/OB

## 2024-01-01 PROCEDURE — 250N000013 HC RX MED GY IP 250 OP 250 PS 637: Performed by: STUDENT IN AN ORGANIZED HEALTH CARE EDUCATION/TRAINING PROGRAM

## 2024-01-01 PROCEDURE — 99232 SBSQ HOSP IP/OBS MODERATE 35: CPT | Performed by: STUDENT IN AN ORGANIZED HEALTH CARE EDUCATION/TRAINING PROGRAM

## 2024-01-01 PROCEDURE — 250N000013 HC RX MED GY IP 250 OP 250 PS 637: Performed by: INTERNAL MEDICINE

## 2024-01-01 PROCEDURE — 96372 THER/PROPH/DIAG INJ SC/IM: CPT | Performed by: INTERNAL MEDICINE

## 2024-01-01 PROCEDURE — G0378 HOSPITAL OBSERVATION PER HR: HCPCS

## 2024-01-01 PROCEDURE — 250N000011 HC RX IP 250 OP 636: Performed by: INTERNAL MEDICINE

## 2024-01-01 RX ORDER — AMLODIPINE BESYLATE 2.5 MG/1
2.5 TABLET ORAL DAILY
Status: DISCONTINUED | OUTPATIENT
Start: 2024-01-02 | End: 2024-01-02

## 2024-01-01 RX ORDER — IODINE/SODIUM IODIDE 2 %
TINCTURE TOPICAL
Status: DISCONTINUED | OUTPATIENT
Start: 2024-01-01 | End: 2024-01-03 | Stop reason: HOSPADM

## 2024-01-01 RX ORDER — FAMOTIDINE 20 MG/1
20 TABLET, FILM COATED ORAL 2 TIMES DAILY
Status: DISCONTINUED | OUTPATIENT
Start: 2024-01-01 | End: 2024-01-03 | Stop reason: HOSPADM

## 2024-01-01 RX ADMIN — ENOXAPARIN SODIUM 40 MG: 40 INJECTION SUBCUTANEOUS at 20:08

## 2024-01-01 RX ADMIN — FAMOTIDINE 20 MG: 20 TABLET ORAL at 20:08

## 2024-01-01 RX ADMIN — DOCUSATE SODIUM 100 MG: 100 CAPSULE, LIQUID FILLED ORAL at 20:07

## 2024-01-01 RX ADMIN — FERRIC OXIDE RED: 8; 8 LOTION TOPICAL at 18:00

## 2024-01-01 RX ADMIN — ENOXAPARIN SODIUM 40 MG: 40 INJECTION SUBCUTANEOUS at 08:31

## 2024-01-01 RX ADMIN — FAMOTIDINE 20 MG: 10 INJECTION, SOLUTION INTRAVENOUS at 08:31

## 2024-01-01 ASSESSMENT — ACTIVITIES OF DAILY LIVING (ADL)
ADLS_ACUITY_SCORE: 42
ADLS_ACUITY_SCORE: 43
ADLS_ACUITY_SCORE: 43
ADLS_ACUITY_SCORE: 42
ADLS_ACUITY_SCORE: 42
ADLS_ACUITY_SCORE: 43
ADLS_ACUITY_SCORE: 43
ADLS_ACUITY_SCORE: 42
ADLS_ACUITY_SCORE: 42

## 2024-01-01 NOTE — PROGRESS NOTES
"PRIMARY DIAGNOSIS: \"GENERIC\" NURSING  OUTPATIENT/OBSERVATION GOALS TO BE MET BEFORE DISCHARGE:  ADLs back to baseline: No    Activity and level of assistance: Up A1 gbw    Pain status: Pain free.    Return to near baseline physical activity: No     Discharge Planner Nurse   Safe discharge environment identified: No  Barriers to discharge: Yes       Entered by: María Chowdhury RN 12/31/2023 10:12 PM     Please review provider order for any additional goals.   Nurse to notify provider when observation goals have been met and patient is ready for discharge.    Pt got up to the bathroom w/ Writer. No sob reported. On RA. Pt was incontinent of stool x1 in the bed. When he got up to the BR pt has one more small soft stool. Pure wick in place.      "

## 2024-01-01 NOTE — PROGRESS NOTES
[facility]    Medicine Progress Note - Hospitalist Service    Date of Admission:  12/27/2023    Assessment & Plan   Donal Rodriguez is a 88 year old male with a medical history significant for hypertension otherwise not much medical problems who is being admitted with acute COVID infection and generalized weakness.    #Acute COVID infection  Symptoms started 2 days prior to admission. Patient is on room air. CTA with mild patchy groundglass opacity in the lungs. Some of this is probably due to dependent atelectasis, however given the patient's history, some of this may represent COVID-19 pneumonitis as well. Multiple scattered indeterminate   pulmonary nodules more so in the lower lungs.  - Completed 5 days of Paxlovid on 1/1  - Medically ready for TCU; discussed with patent and family today, this is still their wish    #Incidental pulmonary nodules  The largest nodule is seen in the subpleural location in the left lower lobe posteriorly measuring 1 cm on series 6 image 126. Tiny calcified granuloma in the right lung. No pleural effusion.  - Will need OP follow up for nodule    #Generalized weakness  PT OT and sw for placement.  xpected to improve with improvement in COVID infection.  - Recommending TCU, medically ready, awaiting placement    #Hypertension  Amlodipine held for Paxlovid interaction.  - Per pharmacy, can start reduced dose. Start 2.5mg on 1/2, can resume 10mg dosing on 1/5    #Non-MI troponin elevation  24 on admission and flat.  EKG with no ST elevations.  Echocardiogram  with normal EF and no acute wall motion abnormality noted           Diet: Combination Diet Regular Diet Adult    DVT Prophylaxis: Enoxaparin (Lovenox) SQ  Solano Catheter: Not present  Lines: None     Cardiac Monitoring: None  Code Status: Full Code      Clinically Significant Risk Factors Present on Admission              # Hypoalbuminemia: Lowest albumin = 3.4 g/dL at 12/28/2023  6:32 AM, will monitor as  "appropriate     # Hypertension: Noted on problem list      # Overweight: Estimated body mass index is 27.26 kg/m  as calculated from the following:    Height as of this encounter: 1.778 m (5' 10\").    Weight as of this encounter: 86.2 kg (190 lb).              Disposition Plan     Expected Discharge Date: 01/08/2024    Discharge Delays: Placement - TCU  Insurance Authorization needed  Isolation - find facility that will accept    Discharge Comments: tcu            KALI PECK MD  Hospitalist Service  [facility]  Securely message with [employer] (more info)  Text page via [employer] Paging/Directory   ______________________________________________________________________    Interval History   No acute concerns. Feeling better. Leg swelling he had developed is improving.      Physical Exam   Vital Signs: Temp: 97.4  F (36.3  C) Temp src: Oral BP: (!) 162/76 Pulse: 56   Resp: 20 SpO2: 94 % O2 Device: None (Room air)    Weight: 190 lbs 0 oz    General.  Awake not in acute distress.  CVS RRR  Lungs.  Normal effort  Abdomen.  Soft nontender nontender  Extremities.  No edema no calf tenderness.  Neurological.  General weakness No focal deficit.  Skin no rash. No pallor.  Psych. Impaired memory.      Medical Decision Making       45 MINUTES SPENT BY ME on the date of service doing chart review, history, exam, documentation & further activities per the note.      Data         Imaging results reviewed over the past 24 hrs:   No results found for this or any previous visit (from the past 24 hour(s)).      "

## 2024-01-01 NOTE — PROGRESS NOTES
"PRIMARY DIAGNOSIS: \"GENERIC\" NURSING  OUTPATIENT/OBSERVATION GOALS TO BE MET BEFORE DISCHARGE:  ADLs back to baseline: No    Activity and level of assistance: Up A x1 with walker  and g.    Pain status: Pain free.    Return to near baseline physical activity: Yes     Discharge Planner Nurse   Safe discharge environment identified: No  Barriers to discharge: Yes       Entered by: Helena Sheppard RN 01/01/2024 5:16 AM   Alert and oriented x4. On RA, elevated bps. Denies pain and SOB during the shift.   Please review provider order for any additional goals.   Nurse to notify provider when observation goals have been met and patient is ready for discharge.  "

## 2024-01-01 NOTE — PROGRESS NOTES
"PRIMARY DIAGNOSIS: \"GENERIC\" NURSING  OUTPATIENT/OBSERVATION GOALS TO BE MET BEFORE DISCHARGE:  ADLs back to baseline: No    Activity and level of assistance: up Ax1 with GB and walker    Pain status: Pain free.    Return to near baseline physical activity: Yes     Discharge Planner Nurse   Safe discharge environment identified: Yes  Barriers to discharge: Yes       Entered by: Helena Sheppard RN 01/01/2024 2:42 AM     Please review provider order for any additional goals.   Nurse to notify provider when observation goals have been met and patient is ready for discharge.  "

## 2024-01-01 NOTE — PROGRESS NOTES
PRIMARY DIAGNOSIS: GENERALIZED WEAKNESS    OUTPATIENT/OBSERVATION GOALS TO BE MET BEFORE DISCHARGE  1. Orthostatic performed: N/A    2. Tolerating PO medications: Yes    3. Return to near baseline physical activity: No    4. Cleared for discharge by consultants (if involved): Yes    Discharge Planner Nurse   Safe discharge environment identified: No  Barriers to discharge: Yes       Entered by: Vicky Costa RN 01/01/2024 2:32 PM    Pt up in chair this morning and back to bed with A1, Gait belt and walker.      Please review provider order for any additional goals.   Nurse to notify provider when observation goals have been met and patient is ready for discharge.

## 2024-01-01 NOTE — PROGRESS NOTES
Care Management Follow Up    Length of Stay (days): 1    Expected Discharge Date: 01/08/2024 (unless COVID recovered sooner and/or a TCU can accept him).      Concerns to be Addressed:  Discharge planning;   Patient plan of care discussed at interdisciplinary rounds: Yes    Anticipated Discharge Disposition:  Transitional care (TCU) is recommended for continued therapy and skilled nursing.     Anticipated Discharge Services:  Continued therapy, skilled nursing and medical management.   Anticipated Discharge DME:  Per therapy (if indicated).    Patient/family educated on Medicare website which has current facility and service quality ratings:  Yes  Education Provided on the Discharge Plan:  Per team  Patient/Family in Agreement with the Plan:  Yes;     Referrals Placed by CM/SW:  Post acute care;   Private pay costs discussed: Not applicable    Additional Information:   Spouse, Pat, asked that referrals be sent to   1) [facility]   2) [facility]   3) [facility]   3) [facility]   [facility]   5) [facility].   Referrals sent and pending.     Social History:  Patient and spouse live in separate apartments (independent living) at [facility]. He is independent with all activities of daily living and instrumental activities of daily living (IADLs) including driving. He ambulates with a walking stick and has walker as well.  Spouse is primary family contact. Family willing to transport at discharge.    Sarah Garcia RN

## 2024-01-01 NOTE — PROGRESS NOTES
PRIMARY DIAGNOSIS: GENERALIZED WEAKNESS    OUTPATIENT/OBSERVATION GOALS TO BE MET BEFORE DISCHARGE  1. Orthostatic performed: N/A    2. Tolerating PO medications: Yes    3. Return to near baseline physical activity: No    4. Cleared for discharge by consultants (if involved): Yes    Discharge Planner Nurse   Safe discharge environment identified: No, Referrals sent out  Barriers to discharge: Yes       Entered by: Vicky Costa RN 01/01/2024 2:34 PM    Pt completed doses of Paxlovid today. Plan to discharge to TCU. Referrals sent. Pt is A1 with gait belt and walker for transfers. Up to the chair for meals. Rash noted on back. Doctor updated, awaiting orders.      Please review provider order for any additional goals.   Nurse to notify provider when observation goals have been met and patient is ready for discharge.

## 2024-01-01 NOTE — PROGRESS NOTES
"PRIMARY DIAGNOSIS: \"GENERIC\" NURSING  OUTPATIENT/OBSERVATION GOALS TO BE MET BEFORE DISCHARGE:  ADLs back to baseline: No    Activity and level of assistance: Up with A1 gbw    Pain status: Pain free.    Return to near baseline physical activity: No     Discharge Planner Nurse   Safe discharge environment identified: No  Barriers to discharge: Yes       Entered by: María Chowdhury RN 12/31/2023 7:38 PM     Please review provider order for any additional goals.   Nurse to notify provider when observation goals have been met and patient is ready for discharge.    Pt on RA sats mid 90's. Denies sob. Has not been getting oob. Reports only getting up w/ therapy. Denies pain. VSS except runs bradycardic.   "

## 2024-01-02 ENCOUNTER — APPOINTMENT (OUTPATIENT)
Dept: OCCUPATIONAL THERAPY | Facility: HOSPITAL | Age: 89
DRG: 179 | End: 2024-01-02
Payer: COMMERCIAL

## 2024-01-02 LAB
CREAT SERPL-MCNC: 0.63 MG/DL (ref 0.67–1.17)
EGFRCR SERPLBLD CKD-EPI 2021: >90 ML/MIN/1.73M2
PLATELET # BLD AUTO: 247 10E3/UL (ref 150–450)

## 2024-01-02 PROCEDURE — 120N000001 HC R&B MED SURG/OB

## 2024-01-02 PROCEDURE — 99232 SBSQ HOSP IP/OBS MODERATE 35: CPT | Performed by: STUDENT IN AN ORGANIZED HEALTH CARE EDUCATION/TRAINING PROGRAM

## 2024-01-02 PROCEDURE — 96372 THER/PROPH/DIAG INJ SC/IM: CPT | Performed by: INTERNAL MEDICINE

## 2024-01-02 PROCEDURE — 97535 SELF CARE MNGMENT TRAINING: CPT | Mod: GO

## 2024-01-02 PROCEDURE — 85049 AUTOMATED PLATELET COUNT: CPT | Performed by: INTERNAL MEDICINE

## 2024-01-02 PROCEDURE — 82565 ASSAY OF CREATININE: CPT | Performed by: INTERNAL MEDICINE

## 2024-01-02 PROCEDURE — 250N000013 HC RX MED GY IP 250 OP 250 PS 637: Performed by: STUDENT IN AN ORGANIZED HEALTH CARE EDUCATION/TRAINING PROGRAM

## 2024-01-02 PROCEDURE — G0378 HOSPITAL OBSERVATION PER HR: HCPCS

## 2024-01-02 PROCEDURE — 250N000011 HC RX IP 250 OP 636: Performed by: INTERNAL MEDICINE

## 2024-01-02 PROCEDURE — 36415 COLL VENOUS BLD VENIPUNCTURE: CPT | Performed by: INTERNAL MEDICINE

## 2024-01-02 PROCEDURE — 250N000013 HC RX MED GY IP 250 OP 250 PS 637: Performed by: INTERNAL MEDICINE

## 2024-01-02 RX ORDER — AMLODIPINE BESYLATE 5 MG/1
5 TABLET ORAL DAILY
Status: DISCONTINUED | OUTPATIENT
Start: 2024-01-03 | End: 2024-01-03 | Stop reason: HOSPADM

## 2024-01-02 RX ADMIN — FAMOTIDINE 20 MG: 20 TABLET ORAL at 20:46

## 2024-01-02 RX ADMIN — FAMOTIDINE 20 MG: 20 TABLET ORAL at 09:02

## 2024-01-02 RX ADMIN — DOCUSATE SODIUM 100 MG: 100 CAPSULE, LIQUID FILLED ORAL at 09:02

## 2024-01-02 RX ADMIN — ENOXAPARIN SODIUM 40 MG: 40 INJECTION SUBCUTANEOUS at 09:02

## 2024-01-02 RX ADMIN — ENOXAPARIN SODIUM 40 MG: 40 INJECTION SUBCUTANEOUS at 20:47

## 2024-01-02 RX ADMIN — DOCUSATE SODIUM 100 MG: 100 CAPSULE, LIQUID FILLED ORAL at 20:46

## 2024-01-02 RX ADMIN — AMLODIPINE BESYLATE 2.5 MG: 2.5 TABLET ORAL at 09:02

## 2024-01-02 ASSESSMENT — ACTIVITIES OF DAILY LIVING (ADL)
ADLS_ACUITY_SCORE: 42
ADLS_ACUITY_SCORE: 44
ADLS_ACUITY_SCORE: 42
ADLS_ACUITY_SCORE: 44
ADLS_ACUITY_SCORE: 42

## 2024-01-02 NOTE — PROGRESS NOTES
[facility]    Medicine Progress Note - Hospitalist Service    Date of Admission:  12/27/2023    Assessment & Plan   Donal Rodriguez is a 88 year old male with a medical history significant for hypertension otherwise not much medical problems who is being admitted with acute COVID infection and generalized weakness.    #Acute COVID infection  Symptoms started 2 days prior to admission. Patient is on room air. CTA with mild patchy groundglass opacity in the lungs. Some of this is probably due to dependent atelectasis, however given the patient's history, some of this may represent COVID-19 pneumonitis as well. Multiple scattered indeterminate pulmonary nodules more so in the lower lungs.  - Completed 5 days of Paxlovid on 1/1  - Medically ready for TCU; re-evaluated by PT today and confirmed recommendation    #Incidental pulmonary nodules  The largest nodule is seen in the subpleural location in the left lower lobe posteriorly measuring 1 cm on series 6 image 126. Tiny calcified granuloma in the right lung. No pleural effusion.  - Will need OP follow up for nodule    #Generalized weakness  PT OT and sw for placement.  xpected to improve with improvement in COVID infection.  - Recommending TCU, medically ready, awaiting placement    #Hypertension  Amlodipine held for Paxlovid interaction.  - Per pharmacy, can start reduced dose. Increase to 5mg on 1/2, can resume 10mg dosing on 1/5    #Non-MI troponin elevation  24 on admission and flat.  EKG with no ST elevations.  Echocardiogram  with normal EF and no acute wall motion abnormality noted           Diet: Combination Diet Regular Diet Adult    DVT Prophylaxis: Enoxaparin (Lovenox) SQ  Solano Catheter: Not present  Lines: None     Cardiac Monitoring: None  Code Status: Full Code      Clinically Significant Risk Factors Present on Admission              # Hypoalbuminemia: Lowest albumin = 3.4 g/dL at 12/28/2023  6:32 AM, will monitor as appropriate  "    # Hypertension: Noted on problem list      # Overweight: Estimated body mass index is 25.5 kg/m  as calculated from the following:    Height as of this encounter: 1.778 m (5' 10\").    Weight as of this encounter: 80.6 kg (177 lb 11.1 oz).              Disposition Plan      Expected Discharge Date: 01/03/2024    Discharge Delays: Placement - TCU  Insurance Authorization needed  Isolation - find facility that will accept  *Medically Ready for Discharge    Discharge Comments: tcu            KALI PECK MD  Hospitalist Service  [facility]  Securely message with [employer] (more info)  Text page via [employer] Paging/Directory   ______________________________________________________________________    Interval History   No acute concerns. Feeling fine.      Physical Exam   Vital Signs: Temp: 97.7  F (36.5  C) Temp src: Oral BP: (!) 146/78 Pulse: 58   Resp: 18 SpO2: 94 % O2 Device: None (Room air)    Weight: 177 lbs 11.05 oz    General.  Awake not in acute distress.  CVS RRR  Lungs.  Normal effort  Abdomen.  Soft nontender nontender  Extremities.  No edema no calf tenderness.  Neurological.  General weakness No focal deficit.  Skin no rash. No pallor.  Psych. Impaired memory.      Medical Decision Making       45 MINUTES SPENT BY ME on the date of service doing chart review, history, exam, documentation & further activities per the note.      Data     I have personally reviewed the following data over the past 24 hrs:    N/A  \   N/A   / 247     N/A N/A N/A /  N/A   N/A N/A 0.63 (L) \       Imaging results reviewed over the past 24 hrs:   No results found for this or any previous visit (from the past 24 hour(s)).      "

## 2024-01-02 NOTE — PLAN OF CARE
PRIMARY DIAGNOSIS: GENERALIZED WEAKNESS    OUTPATIENT/OBSERVATION GOALS TO BE MET BEFORE DISCHARGE  1. Orthostatic performed: N/A    2. Tolerating PO medications: Yes    3. Return to near baseline physical activity: Yes    4. Cleared for discharge by consultants (if involved): Yes    Discharge Planner Nurse   Safe discharge environment identified: No  Barriers to discharge: Yes-placement       Entered by: Dot Meng RN 01/02/2024 2:09 PM     Please review provider order for any additional goals.   Nurse to notify provider when observation goals have been met and patient is ready for discharge.Goal Outcome Evaluation:         A/Ox4. VSS ex HTN. On RA. Denies pain. Up assist x1 GB/W. Regular diet, good appetite. R PIV SL. Using urinal with adequate output. Discharge pending placement. Nursing to continue to monitor.

## 2024-01-02 NOTE — PLAN OF CARE
Problem: Risk for Delirium  Goal: Improved Sleep  Outcome: Progressing     Problem: Gas Exchange Impaired  Goal: Optimal Gas Exchange  Outcome: Progressing  Intervention: Optimize Oxygenation and Ventilation  Recent Flowsheet Documentation  Taken 1/2/2024 0315 by Marcos Mtz RN  Head of Bed (HOB) Positioning: HOB at 20-30 degrees  Taken 1/1/2024 2247 by Marcos Mtz RN  Head of Bed (HOB) Positioning: HOB at 20-30 degrees  Taken 1/1/2024 2010 by Marcos Mtz RN  Head of Bed (HOB) Positioning: HOB at 20-30 degrees    Goal Outcome Evaluation:  VSS. Denies pain and SOB. Special precautions maintained.        Marcos Mtz RN

## 2024-01-02 NOTE — PROGRESS NOTES
PRIMARY DIAGNOSIS: GENERALIZED WEAKNESS    OUTPATIENT/OBSERVATION GOALS TO BE MET BEFORE DISCHARGE  1. Orthostatic performed: N/A    2. Tolerating PO medications: Yes    3. Return to near baseline physical activity: No    4. Cleared for discharge by consultants (if involved): Yes    Discharge Planner Nurse   Safe discharge environment identified: No  Barriers to discharge: Yes       Entered by: Marcos Mtz RN 01/02/2024 6:41 AM     Please review provider order for any additional goals.   Nurse to notify provider when observation goals have been met and patient is ready for discharge.        Marcos Mtz RN

## 2024-01-02 NOTE — PROGRESS NOTES
PRIMARY DIAGNOSIS: GENERALIZED WEAKNESS    OUTPATIENT/OBSERVATION GOALS TO BE MET BEFORE DISCHARGE  1. Orthostatic performed: N/A    2. Tolerating PO medications: Yes    3. Return to near baseline physical activity: No    4. Cleared for discharge by consultants (if involved): Yes    Discharge Planner Nurse   Safe discharge environment identified: No  Barriers to discharge: Yes       Entered by: Marcos Mtz RN 01/01/2024 10:45 PM     Please review provider order for any additional goals.   Nurse to notify provider when observation goals have been met and patient is ready for discharge.        Marcos Mtz RN

## 2024-01-02 NOTE — PLAN OF CARE
PRIMARY DIAGNOSIS: GENERALIZED WEAKNESS    OUTPATIENT/OBSERVATION GOALS TO BE MET BEFORE DISCHARGE  1. Orthostatic performed: N/A    2. Tolerating PO medications: Yes    3. Return to near baseline physical activity: Yes    4. Cleared for discharge by consultants (if involved): Yes    Discharge Planner Nurse   Safe discharge environment identified: No  Barriers to discharge: Yes-placement       Entered by: Dot Meng RN 01/02/2024 11:58 AM     Please review provider order for any additional goals.   Nurse to notify provider when observation goals have been met and patient is ready for discharge.Goal Outcome Evaluation:

## 2024-01-02 NOTE — PROGRESS NOTES
PRIMARY DIAGNOSIS: GENERALIZED WEAKNESS    OUTPATIENT/OBSERVATION GOALS TO BE MET BEFORE DISCHARGE  1. Orthostatic performed: N/A    2. Tolerating PO medications: Yes    3. Return to near baseline physical activity: No, will need some PT for strengthing.    4. Cleared for discharge by consultants (if involved): Yes    Discharge Planner Nurse   Safe discharge environment identified: No  Barriers to discharge: Yes       Entered by: Vicky Costa RN 01/01/2024 6:13 PM    Pt requires A1 with gaitbelt and walker for transfers. Has rash on back that pt states has been there for4 about 2 weeks. Stephanie applied.      Please review provider order for any additional goals.   Nurse to notify provider when observation goals have been met and patient is ready for discharge.

## 2024-01-02 NOTE — PROGRESS NOTES
PRIMARY DIAGNOSIS: GENERALIZED WEAKNESS    OUTPATIENT/OBSERVATION GOALS TO BE MET BEFORE DISCHARGE  1. Orthostatic performed: N/A    2. Tolerating PO medications: Yes    3. Return to near baseline physical activity: No    4. Cleared for discharge by consultants (if involved): Yes    Discharge Planner Nurse   Safe discharge environment identified: No  Barriers to discharge: Yes       Entered by: Marcos Mtz RN 01/02/2024 4:09 AM     Please review provider order for any additional goals.   Nurse to notify provider when observation goals have been met and patient is ready for discharge.        Marcos Mtz RN

## 2024-01-02 NOTE — PROGRESS NOTES
"Care Management Follow Up    Length of Stay (days): 1    Expected Discharge Date: 01/03/2024     Concerns to be Addressed:     TCU Placement  Patient plan of care discussed at interdisciplinary rounds: Yes    Anticipated Discharge Disposition:  TCU     Anticipated Discharge Services:    Anticipated Discharge DME:      Patient/family educated on Medicare website which has current facility and service quality ratings:    Education Provided on the Discharge Plan:    Patient/Family in Agreement with the Plan:      Referrals Placed by CM/SW:  TCU  Private pay costs discussed: Not applicable    Additional Information:  Social history per previous CM note:  \"Patient and spouse live in separate apartments (independent living) at [facility]. He is independent with all activities of daily living and instrumental activities of daily living (IADLs) including driving. He ambulates with a walking stick and has walker as well.  Spouse is primary family contact. Family willing to transport at discharge.\"    Therapy recommendation is TCU. Pt and wife accepting of this. Referrals have been sent and pending. Pt is covid+ so this is a barrier to discharge as we need to find a TCU that is accepting Covid + patients or wait until pt is recovered.    CM got a call from pt's wife today asking if we have a TCU yet. I told her we do not but will keep her in the loop once one is established and she was accepting of this.    Per provider pt is medically ready to discharge.     RNCM to follow for medical progression, recommendations, and final discharge plan.      Alee Rodríguez RN      "

## 2024-01-03 VITALS
RESPIRATION RATE: 20 BRPM | WEIGHT: 177.69 LBS | TEMPERATURE: 97.9 F | BODY MASS INDEX: 25.44 KG/M2 | SYSTOLIC BLOOD PRESSURE: 132 MMHG | HEIGHT: 70 IN | DIASTOLIC BLOOD PRESSURE: 79 MMHG | HEART RATE: 55 BPM | OXYGEN SATURATION: 95 %

## 2024-01-03 PROCEDURE — 250N000013 HC RX MED GY IP 250 OP 250 PS 637: Performed by: STUDENT IN AN ORGANIZED HEALTH CARE EDUCATION/TRAINING PROGRAM

## 2024-01-03 PROCEDURE — 250N000011 HC RX IP 250 OP 636: Performed by: INTERNAL MEDICINE

## 2024-01-03 PROCEDURE — 99239 HOSP IP/OBS DSCHRG MGMT >30: CPT | Performed by: STUDENT IN AN ORGANIZED HEALTH CARE EDUCATION/TRAINING PROGRAM

## 2024-01-03 PROCEDURE — 250N000013 HC RX MED GY IP 250 OP 250 PS 637: Performed by: INTERNAL MEDICINE

## 2024-01-03 PROCEDURE — G0378 HOSPITAL OBSERVATION PER HR: HCPCS

## 2024-01-03 PROCEDURE — 96372 THER/PROPH/DIAG INJ SC/IM: CPT | Performed by: INTERNAL MEDICINE

## 2024-01-03 RX ORDER — AMLODIPINE BESYLATE 10 MG/1
10 TABLET ORAL DAILY
DISCHARGE
Start: 2024-01-05

## 2024-01-03 RX ORDER — AMLODIPINE BESYLATE 5 MG/1
5 TABLET ORAL DAILY
DISCHARGE
Start: 2024-01-04 | End: 2024-01-03

## 2024-01-03 RX ADMIN — AMLODIPINE BESYLATE 5 MG: 5 TABLET ORAL at 08:13

## 2024-01-03 RX ADMIN — FAMOTIDINE 20 MG: 20 TABLET ORAL at 08:13

## 2024-01-03 RX ADMIN — ENOXAPARIN SODIUM 40 MG: 40 INJECTION SUBCUTANEOUS at 08:14

## 2024-01-03 RX ADMIN — DOCUSATE SODIUM 100 MG: 100 CAPSULE, LIQUID FILLED ORAL at 08:14

## 2024-01-03 ASSESSMENT — ACTIVITIES OF DAILY LIVING (ADL)
ADLS_ACUITY_SCORE: 44
ADLS_ACUITY_SCORE: 41
ADLS_ACUITY_SCORE: 42
ADLS_ACUITY_SCORE: 42
ADLS_ACUITY_SCORE: 44
ADLS_ACUITY_SCORE: 44
ADLS_ACUITY_SCORE: 42
ADLS_ACUITY_SCORE: 41

## 2024-01-03 NOTE — PROGRESS NOTES
"Care Management Follow Up    Length of Stay (days): 1    Expected Discharge Date: 01/03/2024     Concerns to be Addressed:     Care progression  Patient plan of care discussed at interdisciplinary rounds: Yes    Anticipated Discharge Disposition:  Transitional care - [facility]     Anticipated Discharge Services:  Transitional care - [facility]  Anticipated Discharge DME:  NA    Patient/family educated on Medicare website which has current facility and service quality ratings:  Yes  Education Provided on the Discharge Plan:  Yes per team  Patient/Family in Agreement with the Plan:  Yes    Referrals Placed by CM/SW:  Yes  Private pay costs discussed: Not applicable    Additional Information:  0831 called [facility] and left a message for Savanna    Sent message to provider, Dr. Tovar, to check if patient is ready for discharge to TCU today?    Provider responded, \"Yes.\"    Called patient's spouse, Wendy. She states the family will transport, but may not get here until 2:00 PM.     Social Hx: \"Covid +. [place] in [place] apartment at [facility]. Spouse lives in a separate apartment. He is independent with ADLs/IADLs, amb with a stick and drives. Spouse is primary family contact. Family to transport.\"     RNCM to follow for medical progression, recommendations, and final discharge plan.     Hazel Merrill, RN     0933 Savanna called back and said she can take patient today between 3-4 PM    1044 per provider, Dr. Tovar, patient and his daughter declined TCU. Patient wants to go home with home care for PT/OT. Bedside nurse said patient able to get up and move around in the room.    1045 called to inform Varun at [facility]    Referral sent for home care     [facility] accepted  Met patient and his daughter at bedside to inform    Updated bedside nurse and HUC  "

## 2024-01-03 NOTE — PLAN OF CARE
PRIMARY DIAGNOSIS: GENERALIZED WEAKNESS    OUTPATIENT/OBSERVATION GOALS TO BE MET BEFORE DISCHARGE  1. Orthostatic performed: N/A    2. Tolerating PO medications: Yes    3. Return to near baseline physical activity: Yes    4. Cleared for discharge by consultants (if involved): Yes    Discharge Planner Nurse   Safe discharge environment identified: Yes  Barriers to discharge: Yes       Entered by: Kandi Kay RN 01/03/2024 6:35 AM     Please review provider order for any additional goals.   Nurse to notify provider when observation goals have been met and patient is ready for discharge.Goal Outcome Evaluation:    VSS on room air. Denies pain. Uses urinal in bed. Cleared for discharge. Waiting for TCU placement.

## 2024-01-03 NOTE — PLAN OF CARE
Physical Therapy Discharge Summary    Reason for therapy discharge:    Discharged to home with home therapy.    Progress towards therapy goal(s). See goals on Care Plan in [facility] electronic health record for goal details.  Goals partially met.  Barriers to achieving goals:   discharge from facility.    Therapy recommendation(s):    Recommend home PT

## 2024-01-03 NOTE — PLAN OF CARE
Occupational Therapy Discharge Summary    Reason for therapy discharge:    Discharged to home with home therapy.    Progress towards therapy goal(s). See goals on Care Plan in [facility] electronic health record for goal details.  Goals partially met.  Barriers to achieving goals:   discharge from facility.    Therapy recommendation(s):    Continued therapy is recommended.  Rationale/Recommendations:  home w/ home therapy.    MARY CARMEN Brito, OTR/L, 1/3/2024, 4:51 PM

## 2024-01-03 NOTE — PLAN OF CARE
PRIMARY DIAGNOSIS: GENERALIZED WEAKNESS    OUTPATIENT/OBSERVATION GOALS TO BE MET BEFORE DISCHARGE  1. Orthostatic performed: N/A    2. Tolerating PO medications: Yes    3. Return to near baseline physical activity: Yes    4. Cleared for discharge by consultants (if involved): Yes    Discharge Planner Nurse   Safe discharge environment identified: Yes  Barriers to discharge: Yes, covid +ve, pending TCU placement.        Entered by: Kandi Kay RN 01/03/2024 3:32 AM     Please review provider order for any additional goals.   Nurse to notify provider when observation goals have been met and patient is ready for discharge.Goal Outcome Evaluation:

## 2024-01-03 NOTE — PLAN OF CARE
Goal Outcome Evaluation:         Pt to discharge home with homecare. Family to transport.

## 2024-01-03 NOTE — PLAN OF CARE
[id number]  AOx4, VSS on RA, denies pain. Assist +1, GB/W. No acute events reported at this time, will continue to monitor.    PRIMARY DIAGNOSIS: GENERALIZED WEAKNESS    OUTPATIENT/OBSERVATION GOALS TO BE MET BEFORE DISCHARGE  1. Orthostatic performed: N/A    2. Tolerating PO medications: Yes    3. Return to near baseline physical activity: Yes    4. Cleared for discharge by consultants (if involved): Yes    Discharge Planner Nurse   Safe discharge environment identified: No  Barriers to discharge: Yes       Entered by: Will Schulz RN 01/02/2024 10:05 PM     Please review provider order for any additional goals.   Nurse to notify provider when observation goals have been met and patient is ready for discharge.

## 2024-01-03 NOTE — DISCHARGE SUMMARY
Discharge Note    Patient discharged to home via private vehicle  accompanied by son.  IV: Discontinued  Prescriptions N/A.   Belongings reviewed and sent with patient and son.    Home medications returned to patient: NA  Equipment sent with: patient, N/A.   patient and son verbalizes understanding of discharge instructions. AVS given to patient and son. Patient left unit in stable condition via wheelchair escorted off unit by CNA.

## 2024-01-03 NOTE — PROGRESS NOTES
Care Management Discharge Note    Discharge Date: 01/03/2024       Discharge Disposition: Home, Home Care - [facility] PT/OT    Discharge Services: Home, Home Care - [facility] PT/OT    Discharge DME: None    Discharge Transportation:  Family/friends    Private pay costs discussed: Not applicable    Does the patient's insurance plan have a 3 day qualifying hospital stay waiver?  No    PAS Confirmation Code:  NA  Patient/family educated on Medicare website which has current facility and service quality ratings: yes    Education Provided on the Discharge Plan: Yes per team  Persons Notified of Discharge Plans: Patient per team  Patient/Family in Agreement with the Plan: yes    Handoff Referral Completed: Yes    Additional Information:  Patient discharge Home, Home Care - [facility] PT/OT. Family will transport.    Hazel Merrill RN

## 2024-01-03 NOTE — PLAN OF CARE
[id number]  Assessment not changed from previous. Will continue to monitor.    PRIMARY DIAGNOSIS: GENERALIZED WEAKNESS    OUTPATIENT/OBSERVATION GOALS TO BE MET BEFORE DISCHARGE  1. Orthostatic performed: N/A    2. Tolerating PO medications: Yes    3. Return to near baseline physical activity: Yes    4. Cleared for discharge by consultants (if involved): Yes    Discharge Planner Nurse   Safe discharge environment identified: No  Barriers to discharge: Yes       Entered by: Will Schulz RN 01/02/2024 10:12 PM     Please review provider order for any additional goals.   Nurse to notify provider when observation goals have been met and patient is ready for discharge.

## 2024-01-03 NOTE — DISCHARGE SUMMARY
"[facility]  Hospitalist Discharge Summary      Date of Admission:  12/27/2023  Date of Discharge:  1/3/2024  Discharging Provider: KALI PECK MD  Discharge Service: Hospitalist Service    Discharge Diagnoses     #COVID-19 infection  #Incidental pulmonary nodules  #Generalized weakness  #Primary HTN  #Non-MI troponin elevation    Clinically Significant Risk Factors     # Overweight: Estimated body mass index is 25.5 kg/m  as calculated from the following:    Height as of this encounter: 1.778 m (5' 10\").    Weight as of this encounter: 80.6 kg (177 lb 11.1 oz).       Follow-ups Needed After Discharge   Follow-up Appointments     Follow Up and recommended labs and tests      Follow up with primary care provider in 2 weeks.  No follow up labs or   test are needed.          Unresulted Labs Ordered in the Past 30 Days of this Admission       No orders found from 11/27/2023 to 12/28/2023.            Discharge Disposition   Admited to home care:   Discharged to home  Condition at discharge: Stable    Hospital Course   Donal Rodriguez is a 88 year old male with a medical history significant for hypertension and otherwise well who is being admitted with acute COVID infection and generalized weakness.    #COVID-19 infection  Symptoms started 2 days prior to admission. Patient remained on room air. CTA with mild patchy groundglass opacity in the lungs. Some of this is probably due to dependent atelectasis, however given the patient's history, some of this may represent COVID-19 pneumonitis as well. Multiple scattered indeterminate pulmonary nodules more so in the lower lungs.  - Completed 5 days of Paxlovid on 1/1  - TCU was recommended, but patient ultimately declined and preferred to go home with  PT/OT/RN, which was ordered    #Incidental pulmonary nodules  The largest nodule is seen in the subpleural location in the left lower lobe posteriorly measuring 1 cm on series 6 image 126. Tiny " calcified granuloma in the right lung. No pleural effusion.  - Will need OP follow up for nodules, repeat chest CT in 3 months with PCP to see if they resolve and represented infection or if they need further evaluation    #Generalized weakness  PT OT and sw for placement. Recommended TCU, but patient's strength improved significantly during his stay and he preferred to go home with HH PT/OT/RN.  - Recommending TCU, but patient declined and HH PT/OT/RN were ordered    #Primary hypertension  Amlodipine held for Paxlovid interaction.  - Per pharmacy, can resume 10mg dosing on 1/5, should not take until then (Paxlovid can increase serum levels of amlodipine)    #Non-MI troponin elevation  24 on admission and flat.  EKG with no ST elevations.  Echocardiogram with normal EF and no acute wall motion abnormality noted     Consultations This Hospital Stay   PHYSICAL THERAPY ADULT IP CONSULT  OCCUPATIONAL THERAPY ADULT IP CONSULT  SOCIAL WORK IP CONSULT  SOCIAL WORK IP CONSULT  PHYSICAL THERAPY ADULT IP CONSULT  OCCUPATIONAL THERAPY ADULT IP CONSULT    Code Status   Full Code    Time Spent on this Encounter   I, KALI PECK MD, personally saw the patient today and spent greater than 30 minutes discharging this patient.       KALI PECK MD  [place] [id number]  Phone: [phone]  Fax: [phone]  ______________________________________________________________________    Physical Exam   Vital Signs: Temp: 97.9  F (36.6  C) Temp src: Oral BP: 132/79 Pulse: 55   Resp: 20 SpO2: 95 % O2 Device: None (Room air)    Weight: 177 lbs 11.05 oz    General.  Awake not in acute distress.  CVS RRR  Lungs.  Normal effort  Abdomen.  Soft nontender nontender  Extremities.  No edema in his lower extremities  Neurological.  No focal deficit  Skin no rash. No pallor.  Psych. A&Ox4         Primary Care Physician   Andrzej Valdez    Discharge Orders      Home Care  Referral      General info for SNF    Admitted for weakness related to COVID. Symptoms mild otherwise, did not need O2.    Length of Stay Estimate: Short Term Care: Estimated # of Days <30  Condition at Discharge: Improving  Level of care:skilled   Rehabilitation Potential: Excellent  Admission H&P remains valid and up-to-date: Yes  Recent Chemotherapy: N/A  Use Nursing Home Standing Orders: Yes     Follow Up and recommended labs and tests    Follow up with primary care provider in 2 weeks.  No follow up labs or test are needed.     Reason for your hospital stay    You were admitted for weakness related to COVID. The COVID infection itself was mild and you did not need oxygen, but the related weakness requires further therapy at the TCU before you go home.     Activity - Up with nursing assistance     Physical Therapy Adult Consult    Evaluate and treat as clinically indicated.    Reason:  deconditioning     Occupational Therapy Adult Consult    Evaluate and treat as clinically indicated.    Reason:  deconditioning     Airborne and Contact Isolation     Fall precautions     Diet    Follow this diet upon discharge: Orders Placed This Encounter      Combination Diet Regular Diet Adult       Significant Results and Procedures   Most Recent 3 CBC's:  Recent Labs   Lab Test 01/02/24  0620 12/30/23  0653 12/28/23  0632 12/27/23  1950 07/10/23  1155   WBC  --   --  4.4 6.5 7.4   HGB  --   --  13.1* 15.9 15.7   MCV  --   --  107* 107* 104*    202 161 200 260     Most Recent 3 BMP's:  Recent Labs   Lab Test 01/02/24  0620 12/30/23  0653 12/28/23  0632 12/27/23  1950 07/10/23  1155   NA  --   --  139 139 143   POTASSIUM  --   --  3.7 4.0 4.5   CHLORIDE  --   --  102 99 106   CO2  --   --  27 26 24   BUN  --   --  14.7 17.6 16.2   CR 0.63* 0.60* 0.68 0.76 0.69   ANIONGAP  --   --  10 14 13   FREIDA  --   --  8.5* 9.2 9.1   GLC  --   --  95 111* 87   ,   Results for orders placed or performed during the hospital encounter  of 12/27/23   Chest XR,  PA & LAT    Narrative    EXAM: XR CHEST 2 VIEWS  LOCATION: [facility]  DATE: 12/27/2023    INDICATION: Cough, fever, weakness  COMPARISON: None.      Impression    IMPRESSION: Heart is normal in size. Lungs are clear.   CT Chest Pulmonary Embolism w Contrast    Narrative    EXAM: CT CHEST PULMONARY EMBOLISM W CONTRAST  LOCATION: [facility]  DATE: 12/28/2023    INDICATION: Generalized weakness. COVID infection. Evaluate for PE.  COMPARISON: None.  TECHNIQUE: CT chest pulmonary angiogram during arterial phase injection of IV contrast. Multiplanar reformats and MIP reconstructions were performed. Dose reduction techniques were used.   CONTRAST: isovue 370 90ml    FINDINGS:  ANGIOGRAM CHEST: Pulmonary arteries are normal caliber and negative for pulmonary emboli. Thoracic aorta is negative for dissection. No CT evidence of right heart strain.    LUNGS AND PLEURA: Mild patchy groundglass opacity in the lungs. Some of this is probably due to dependent atelectasis, however given the patient's history, some of this may represent COVID-19 pneumonitis as well. Multiple scattered indeterminate   pulmonary nodules more so in the lower lungs. The largest nodule is seen in the subpleural location in the left lower lobe posteriorly measuring 1 cm on series 6 image 126. Follow-up is recommended. Tiny calcified granuloma in the right lung. No pleural   effusions.    MEDIASTINUM/AXILLAE: Mildly enlarged right subcarinal lymph node measures 1.2 cm in diameter on series 4 image 146. Normal heart size. No pericardial effusion. Normal caliber thoracic aorta. Esophagus is dilated and partially fluid-filled, nonspecific.    CORONARY ARTERY CALCIFICATION: Severe.    UPPER ABDOMEN: Colonic diverticulosis.    MUSCULOSKELETAL: Normal.      Impression    IMPRESSION:  1.  Negative for pulmonary embolism.    2.  Mild patchy groundglass opacities in the lungs, some of which  is due to dependent atelectasis, however some of which could represent COVID-19 pneumonitis.    3.  Multiple indeterminate pulmonary nodules measuring up to 1 cm. These may be infectious or inflammatory in nature but ultimately indeterminate and would be atypical for COVID-19 pneumonitis. Recommend follow-up chest CT in 3 months following   appropriate therapy for reevaluation.    4.  Mildly enlarged mediastinal lymph nodes likely reactive and can be reassessed on follow-up.    5.  Esophagus is dilated and partially fluid-filled which is a nonspecific finding. Correlation for any signs or dysmotility disorder.    6.  Severe coronary artery calcification.   Echocardiogram Complete    Narrative    [id number]  [id number]  [id number]  [id number]^GAURAV^NIKO^[place] [address]     Name: MARTA KENNEDY  MRN: [id number]  : 1935  Study Date: 2023 11:10 AM  Age: 88 yrs  Gender: Male  Patient Location: [facility]  Reason For Study: MI  Ordering Physician: NIKO NOLASCO  Performed By: JOSE M     BSA: 2.0 m2  Height: 70 in  Weight: 190 lb  HR: 61  BP: 130/66 mmHg  ______________________________________________________________________________  Procedure  Complete Portable Echo Adult. Definity (NDC #48762-696) given intravenously.  ______________________________________________________________________________  Interpretation Summary     1. Normal left ventricular size and systolic performance with a visually  estimated ejection fraction of 65%.  2. There is trace aortic insufficiency.  3. Normal right ventricular size and systolic performance.  ______________________________________________________________________________  Left ventricle:  Normal left ventricular size and systolic performance with a visually  estimated ejection fraction of 65%. There is normal regional wall motion.  There is borderline increase in left ventricular wall thickness.     Assessment of LV Diastolic  Function: The cumulative findings suggest normal  diastolic filling [The septal e' velocity is > 7 cm/s & lateral e' velocity  is  < 10 cm/s. The average E/e' is < 14. The TR velocity cannot be determined due  to insufficient tricuspid insufficiency signal. Left atrial volume index is  less than 34 mL/mÂ ].     Right ventricle:  Normal right ventricular size and systolic performance.     Left atrium:  The left atrium is of normal size.     Right atrium:  The right atrium is of normal size.     IVC:  The IVC is of normal caliber.     Aortic valve:  The aortic valve is not well visualized, but suspected to be comprised of  three cusps. There is no significant aortic stenosis. There is trace aortic  insufficiency.     Mitral valve:  The mitral valve appears morphologically normal. There is trace mitral  insufficiency.     Tricuspid valve:  The tricuspid valve is grossly morphologically normal. There is trace  tricuspid insufficiency.     Pulmonic valve:  The pulmonic valve is grossly morphologically normal.     Thoracic aorta:  The aortic root and proximal ascending aorta are of normal dimension.     Pericardium:  There is no significant pericardial effusion.  ______________________________________________________________________________  ______________________________________________________________________________  MMode/2D Measurements & Calculations  IVSd: 1.2 cm  LVIDd: 4.7 cm  LVIDs: 3.1 cm  LVPWd: 1.2 cm  FS: 33.9 %  LV mass(C)d: 215.1 grams  LV mass(C)dI: 105.3 grams/m2  Ao root diam: 4.0 cm  asc Aorta Diam: 3.6 cm  LVOT diam: 2.1 cm  LVOT area: 3.5 cm2  Ao root diam index Ht(cm/m): 2.2  Ao root diam index BSA (cm/m2): 2.0  Asc Ao diam index BSA (cm/m2): 1.8  Asc Ao diam index Ht(cm/m): 2.0  LA Volume (BP): 61.8 ml     LA Volume Indexed (AL/bp): 32.2 ml/m2  RWT: 0.52  TAPSE: 2.0 cm     Time Measurements  MM HR: 62.0 BPM     Doppler Measurements & Calculations  MV E max william: 62.6 cm/sec  MV A max william: 109.0  cm/sec  MV E/A: 0.57  MV dec slope: 282.0 cm/sec2  MV dec time: 0.22 sec  Ao V2 max: 148.0 cm/sec  Ao max P.0 mmHg  Ao V2 mean: 96.6 cm/sec  Ao mean P.0 mmHg  Ao V2 VTI: 29.5 cm  PARVEEN(I,D): 3.0 cm2  PARVEEN(V,D): 3.0 cm2  LV V1 max P.7 mmHg  LV V1 max: 129.0 cm/sec  LV V1 VTI: 25.8 cm     SV(LVOT): 89.4 ml  SI(LVOT): 43.8 ml/m2  Pulm Sys Kwan: 51.9 cm/sec  Pulm Carlos Kwan: 38.8 cm/sec  Pulm A Revs Kwan: 30.6 cm/sec  Pulm S/D: 1.3  AV Kwan Ratio (DI): 0.87  PARVEEN Index (cm2/m2): 1.5  E/E': 7.2  E/E' av.1  Lateral E/e': 6.9  Medial E/e': 7.2  Peak E' Kwan: 8.7 cm/sec  RV S Kwan: 13.9 cm/sec     ______________________________________________________________________________  Report approved by: Parris Beyer 2023 03:10 PM             Discharge Medications   Current Discharge Medication List        CONTINUE these medications which have CHANGED    Details   amLODIPine (NORVASC) 10 MG tablet Take 1 tablet (10 mg) by mouth daily    Associated Diagnoses: Benign essential hypertension           Allergies   No Known Allergies

## 2024-06-04 NOTE — PLAN OF CARE
PRIMARY DIAGNOSIS: Covid  OUTPATIENT/OBSERVATION GOALS TO BE MET BEFORE DISCHARGE:  Dyspnea improved and O2 sats >88% on RA or back to baseline O2 levels: Yes   SpO2: 91 %, O2 Device: None (Room air)    Tolerating oral abx or appropriate plans made outpatient infusion: Yes    Vitals signs normal or return to baseline: Yes    Short term supplemental O2 needed with activity at home:  No home 02 eval has been ordered.    Tolerate oral intake to maintain hydration: Yes    Return to near baseline physical activity: Yes    Discharge Planner Nurse   Safe discharge environment identified: Yes  Barriers to discharge: Yes       Entered by: Javier Guerra RN 12/30/2023 8:55 PM     Please review provider order for any additional goals.   Nurse to notify provider when observation goals have been met and patient is ready for discharge.Goal Outcome Evaluation:                         Detail Level: Detailed

## 2024-07-01 ENCOUNTER — LAB REQUISITION (OUTPATIENT)
Dept: LAB | Facility: CLINIC | Age: 89
End: 2024-07-01
Payer: COMMERCIAL

## 2024-07-01 DIAGNOSIS — I10 ESSENTIAL (PRIMARY) HYPERTENSION: ICD-10-CM

## 2024-07-01 DIAGNOSIS — E78.49 OTHER HYPERLIPIDEMIA: ICD-10-CM

## 2024-07-01 LAB
ALBUMIN SERPL BCG-MCNC: 4.1 G/DL (ref 3.5–5.2)
ALP SERPL-CCNC: 64 U/L (ref 40–150)
ALT SERPL W P-5'-P-CCNC: 16 U/L (ref 0–70)
ANION GAP SERPL CALCULATED.3IONS-SCNC: 12 MMOL/L (ref 7–15)
AST SERPL W P-5'-P-CCNC: 24 U/L (ref 0–45)
BILIRUB SERPL-MCNC: 0.3 MG/DL
BUN SERPL-MCNC: 13.1 MG/DL (ref 8–23)
CALCIUM SERPL-MCNC: 9.1 MG/DL (ref 8.8–10.2)
CHLORIDE SERPL-SCNC: 103 MMOL/L (ref 98–107)
CHOLEST SERPL-MCNC: 186 MG/DL
CREAT SERPL-MCNC: 0.68 MG/DL (ref 0.67–1.17)
DEPRECATED HCO3 PLAS-SCNC: 25 MMOL/L (ref 22–29)
EGFRCR SERPLBLD CKD-EPI 2021: 89 ML/MIN/1.73M2
ERYTHROCYTE [DISTWIDTH] IN BLOOD BY AUTOMATED COUNT: 16.9 % (ref 10–15)
FASTING STATUS PATIENT QL REPORTED: ABNORMAL
FASTING STATUS PATIENT QL REPORTED: ABNORMAL
GLUCOSE SERPL-MCNC: 101 MG/DL (ref 70–99)
HCT VFR BLD AUTO: 41.9 % (ref 40–53)
HDLC SERPL-MCNC: 51 MG/DL
HGB BLD-MCNC: 14.5 G/DL (ref 13.3–17.7)
LDLC SERPL CALC-MCNC: 115 MG/DL
MCH RBC QN AUTO: 40.4 PG (ref 26.5–33)
MCHC RBC AUTO-ENTMCNC: 34.6 G/DL (ref 31.5–36.5)
MCV RBC AUTO: 117 FL (ref 78–100)
NONHDLC SERPL-MCNC: 135 MG/DL
PLATELET # BLD AUTO: 228 10E3/UL (ref 150–450)
POTASSIUM SERPL-SCNC: 4.4 MMOL/L (ref 3.4–5.3)
PROT SERPL-MCNC: 6.5 G/DL (ref 6.4–8.3)
RBC # BLD AUTO: 3.59 10E6/UL (ref 4.4–5.9)
SODIUM SERPL-SCNC: 140 MMOL/L (ref 135–145)
TRIGL SERPL-MCNC: 101 MG/DL
WBC # BLD AUTO: 6.3 10E3/UL (ref 4–11)

## 2024-07-01 PROCEDURE — 80053 COMPREHEN METABOLIC PANEL: CPT | Mod: ORL | Performed by: FAMILY MEDICINE

## 2024-07-01 PROCEDURE — 80061 LIPID PANEL: CPT | Mod: ORL | Performed by: FAMILY MEDICINE

## 2024-07-01 PROCEDURE — 85027 COMPLETE CBC AUTOMATED: CPT | Mod: ORL | Performed by: FAMILY MEDICINE

## 2025-06-09 ENCOUNTER — LAB REQUISITION (OUTPATIENT)
Dept: LAB | Facility: CLINIC | Age: 89
End: 2025-06-09
Payer: MEDICARE

## 2025-06-09 DIAGNOSIS — R41.3 OTHER AMNESIA: ICD-10-CM

## 2025-06-09 LAB
ERYTHROCYTE [DISTWIDTH] IN BLOOD BY AUTOMATED COUNT: 15.9 % (ref 10–15)
HCT VFR BLD AUTO: 33.9 % (ref 40–53)
HGB BLD-MCNC: 11.8 G/DL (ref 13.3–17.7)
MCH RBC QN AUTO: 44 PG (ref 26.5–33)
MCHC RBC AUTO-ENTMCNC: 34.8 G/DL (ref 31.5–36.5)
MCV RBC AUTO: 127 FL (ref 78–100)
PLATELET # BLD AUTO: 219 10E3/UL (ref 150–450)
RBC # BLD AUTO: 2.68 10E6/UL (ref 4.4–5.9)
WBC # BLD AUTO: 4.6 10E3/UL (ref 4–11)

## 2025-06-09 PROCEDURE — 80061 LIPID PANEL: CPT | Mod: ORL | Performed by: PHYSICIAN ASSISTANT

## 2025-06-09 PROCEDURE — 80053 COMPREHEN METABOLIC PANEL: CPT | Mod: ORL | Performed by: PHYSICIAN ASSISTANT

## 2025-06-10 LAB
ALBUMIN SERPL BCG-MCNC: 4 G/DL (ref 3.5–5.2)
ALP SERPL-CCNC: 59 U/L (ref 40–150)
ALT SERPL W P-5'-P-CCNC: 18 U/L (ref 0–70)
ANION GAP SERPL CALCULATED.3IONS-SCNC: 11 MMOL/L (ref 7–15)
AST SERPL W P-5'-P-CCNC: 29 U/L (ref 0–45)
BILIRUB SERPL-MCNC: 0.5 MG/DL
BUN SERPL-MCNC: 16.4 MG/DL (ref 8–23)
CALCIUM SERPL-MCNC: 8.7 MG/DL (ref 8.8–10.4)
CHLORIDE SERPL-SCNC: 103 MMOL/L (ref 98–107)
CHOLEST SERPL-MCNC: 177 MG/DL
CREAT SERPL-MCNC: 0.77 MG/DL (ref 0.67–1.17)
EGFRCR SERPLBLD CKD-EPI 2021: 86 ML/MIN/1.73M2
FASTING STATUS PATIENT QL REPORTED: ABNORMAL
FASTING STATUS PATIENT QL REPORTED: ABNORMAL
GLUCOSE SERPL-MCNC: 105 MG/DL (ref 70–99)
HCO3 SERPL-SCNC: 26 MMOL/L (ref 22–29)
HDLC SERPL-MCNC: 48 MG/DL
LDLC SERPL CALC-MCNC: 110 MG/DL
NONHDLC SERPL-MCNC: 129 MG/DL
POTASSIUM SERPL-SCNC: 3.9 MMOL/L (ref 3.4–5.3)
PROT SERPL-MCNC: 6.2 G/DL (ref 6.4–8.3)
SODIUM SERPL-SCNC: 140 MMOL/L (ref 135–145)
TRIGL SERPL-MCNC: 93 MG/DL